# Patient Record
Sex: FEMALE | Race: WHITE | NOT HISPANIC OR LATINO | Employment: FULL TIME | URBAN - METROPOLITAN AREA
[De-identification: names, ages, dates, MRNs, and addresses within clinical notes are randomized per-mention and may not be internally consistent; named-entity substitution may affect disease eponyms.]

---

## 2022-01-19 ENCOUNTER — TRANSCRIBE ORDERS (OUTPATIENT)
Dept: PHYSICAL THERAPY | Facility: HOSPITAL | Age: 47
End: 2022-01-19

## 2022-01-19 DIAGNOSIS — S06.9X0A TRAUMATIC BRAIN INJURY, WITHOUT LOSS OF CONSCIOUSNESS, INITIAL ENCOUNTER: Primary | ICD-10-CM

## 2022-01-25 ENCOUNTER — HOSPITAL ENCOUNTER (OUTPATIENT)
Dept: SPEECH THERAPY | Facility: HOSPITAL | Age: 47
Setting detail: THERAPIES SERIES
Discharge: HOME | End: 2022-01-25

## 2022-01-25 ENCOUNTER — HOSPITAL ENCOUNTER (OUTPATIENT)
Dept: OCCUPATIONAL THERAPY | Facility: HOSPITAL | Age: 47
Setting detail: THERAPIES SERIES
Discharge: HOME | End: 2022-01-25

## 2022-01-25 DIAGNOSIS — S06.9X0A TRAUMATIC BRAIN INJURY WITHOUT LOSS OF CONSCIOUSNESS, INITIAL ENCOUNTER: Primary | ICD-10-CM

## 2022-01-25 DIAGNOSIS — R41.841 COGNITIVE COMMUNICATION DEFICIT: ICD-10-CM

## 2022-01-25 DIAGNOSIS — R53.1 GENERALIZED WEAKNESS: ICD-10-CM

## 2022-01-25 PROCEDURE — 97166 OT EVAL MOD COMPLEX 45 MIN: CPT

## 2022-01-25 PROCEDURE — 96125 COGNITIVE TEST BY HC PRO: CPT
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
## 2022-02-01 ENCOUNTER — HOSPITAL ENCOUNTER (OUTPATIENT)
Dept: OCCUPATIONAL THERAPY | Facility: HOSPITAL | Age: 47
Setting detail: THERAPIES SERIES
Discharge: HOME | End: 2022-02-01

## 2022-02-01 ENCOUNTER — HOSPITAL ENCOUNTER (OUTPATIENT)
Dept: PHYSICAL THERAPY | Facility: HOSPITAL | Age: 47
Setting detail: THERAPIES SERIES
Discharge: HOME | End: 2022-02-01

## 2022-02-01 ENCOUNTER — HOSPITAL ENCOUNTER (OUTPATIENT)
Dept: SPEECH THERAPY | Facility: HOSPITAL | Age: 47
Setting detail: THERAPIES SERIES
Discharge: HOME | End: 2022-02-01

## 2022-02-01 DIAGNOSIS — R41.841 COGNITIVE COMMUNICATION DEFICIT: ICD-10-CM

## 2022-02-01 DIAGNOSIS — M54.2 PAIN OF NECK WITH RECENT TRAUMATIC INJURY: ICD-10-CM

## 2022-02-01 DIAGNOSIS — S06.9X0A TRAUMATIC BRAIN INJURY WITHOUT LOSS OF CONSCIOUSNESS, INITIAL ENCOUNTER: Primary | ICD-10-CM

## 2022-02-01 DIAGNOSIS — R53.1 GENERALIZED WEAKNESS: ICD-10-CM

## 2022-02-01 DIAGNOSIS — M53.9 CERVICAL DYSFUNCTION: Primary | ICD-10-CM

## 2022-02-01 PROCEDURE — 97535 SELF CARE MNGMENT TRAINING: CPT

## 2022-02-01 PROCEDURE — 97110 THERAPEUTIC EXERCISES: CPT

## 2022-02-01 PROCEDURE — 97161 PT EVAL LOW COMPLEX 20 MIN: CPT

## 2022-02-01 PROCEDURE — 97130 THER IVNTJ EA ADDL 15 MIN: CPT

## 2022-02-01 PROCEDURE — 97129 THER IVNTJ 1ST 15 MIN: CPT
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
## 2022-02-01 NOTE — THERAPY TREATMENT NOTE
Outpatient Speech Language Pathology   Adult Speech Language Cognitive Treatment Note  [facility]     Patient Name: Lisandra Muniz  : 1975  MRN: [id number]  Today's Date: 2022         Visit Date: 2022   There is no problem list on file for this patient.         Visit Dx:    ICD-10-CM ICD-9-CM   1. 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 Cognitive communication deficit  R41.841 799.52        OP SLP Assessment/Plan - 22 1444        SLP Assessment    Functional Problems Speech Language- Adult/Cognition  -AL    SLP Diagnosis Mild high-level cognitive impairment  -AL    Prognosis Excellent (comment)  -AL       SLP Plan    Plan Comments Continue current plan of care.  -AL          User Key  (r) = Recorded By, (t) = Taken By, (c) = Cosigned By    Initials Name Provider Type    Elsa Hays MS CCC-SLP Speech and Language Pathologist                 Patient was wearing a face mask during this therapy encounter. Therapist used appropriate personal protective equipment including mask, eye protection and gloves.  Mask used was standard procedure mask. Appropriate PPE was worn during the entire therapy session. Hand hygiene was completed before and after therapy session. Patient is not in enhanced droplet precautions.                              SLP OP Goals     Row Name 22 1400          Subjective Comments    Subjective Comments Pt participated well in session.  -AL            Subjective Pain    Able to rate subjective pain? yes  -AL     Pre-Treatment Pain Level 0  -AL     Post-Treatment Pain Level 0  -AL            Verbal Expression Goals    Patient will be able to use verbal expressive language skills to communicate effectively in social/avocational/work setting 100%:; without cues  -AL     Status: Patient will be able to use verbal expressive language skills to communicate effectively in social/avocational/work setting  Progressing as expected  -AL     Patient will improve verbal expressive language skills by completing divergent naming tasks 100%:; without cues  -AL     Status: Patient will improve verbal expressive language skills by completing divergent naming tasks Progressing as expected  -AL     Comments: Patient will improve verbal expressive language skills by completing divergent naming tasks Red items: 8 with NO cues in 1 minute, 10 with MIN-MOD cues.  -AL            Memory Goals    Status: Patient will be able to remember information needed to participate in avocational activities Progressing as expected  -AL     Patient will demonstrate improved ability to recall information by immediately recalling a series of words 90%:; without cues  -AL     Status: Patient will demonstrate improved ability to recall information by immediately recalling a series of words Progressing as expected  -AL     Comments: Patient will demonstrate improved ability to recall information by immediately recalling a series of words 4 words reversed: 60% with NO cues, 100% with MIN-MOD cues. Pt reported increased difficulty concentrating as task progressed.  -AL     Patient will demonstrate improved ability to recall information by listening to paragraph and answering yes/no questions 90%:; without cues  -AL     Comments: Patient will demonstrate improved ability to recall information by listening to paragraph and answering yes/no questions Not addressed this session due to time constraints.  -AL            Attention/Orientation Goals    Patient will be able to execute all activities necessary to manage a home Independently  -AL     Status: Patient will be able to execute all activities necessary to manage a home Progressing as expected  -AL     Patient will improve attention skills by sustaining focus to high-level cognitive tasks in order to complete task 100%:; without cues  -AL     Status: Patient will improve attention skills by sustaining  "focus to high-level cognitive tasks in order to complete task Progressing as expected  -AL     Comments: Patient will improve attention skills by sustaining focus to high-level cognitive tasks in order to complete task Diagnostic treatment: Pt completed moderate level logic puzzle with 90% accuracy with NO cues, 100% with MIN cues. She reported feeling like the task took her longer than it would have prior to her brain injury. Alternating attention for \"Blink\" card sort atsk: Pt sorted 25 cards in 1 minute, 40 seconds with 1 cue. Pt played against clinician with minimally reduced speed.  -AL           User Key  (r) = Recorded By, (t) = Taken By, (c) = Cosigned By    Initials Name Provider Type    Elsa Hays MS CCC-SLP Speech and Language Pathologist               OP SLP Education     Row Name 02/01/22 1444       Education    Barriers to Learning No barriers identified  -AL    Education Provided Described results of evaluation  -AL    Learning Motivation Strong  -AL    Learning Method Explanation  -AL    Teaching Response Verbalized understanding; Demonstrated understanding  -AL    Education Comments Reviewed results of RBANS administered in previous session.  -AL          User Key  (r) = Recorded By, (t) = Taken By, (c) = Cosigned By    Initials Name Effective Dates    Elsa Hays MS CCC-SLP 06/16/21 -                      Time Calculation:   SLP Start Time: 1300  SLP Stop Time: 1345  SLP Time Calculation (min): 45 min  Timed Charges  10495-QZ Dev of Cogn Skills Initial Minutes: 15  30889-JJ Dev of Cogn Skills Add Minutes: 30  Total Minutes  Timed Charges Total Minutes: 45   Total Minutes: 45    Therapy Charges for Today     Code Description Service Date Service Provider Modifiers Qty    54069683622 HC ST DEV OF COGN SKILLS INITIAL 15 MIN 2/1/2022 Elsa Allen MS CCC-SLP 59 1    27792589470 HC ST DEV OF COGN SKILLS EACH ADDT'L 15 MIN 2/1/2022 Elsa Allen MS CCC-SLP 59 2           "         Elsa Allen, MS CCC-SLP  2/1/2022

## 2022-02-08 ENCOUNTER — APPOINTMENT (OUTPATIENT)
Dept: SPEECH THERAPY | Facility: HOSPITAL | Age: 47
End: 2022-02-08

## 2022-02-08 ENCOUNTER — APPOINTMENT (OUTPATIENT)
Dept: PHYSICAL THERAPY | Facility: HOSPITAL | Age: 47
End: 2022-02-08

## 2022-02-08 ENCOUNTER — APPOINTMENT (OUTPATIENT)
Dept: OCCUPATIONAL THERAPY | Facility: HOSPITAL | Age: 47
End: 2022-02-08

## 2022-02-15 ENCOUNTER — HOSPITAL ENCOUNTER (OUTPATIENT)
Dept: SPEECH THERAPY | Facility: HOSPITAL | Age: 47
Setting detail: THERAPIES SERIES
Discharge: HOME | End: 2022-02-15

## 2022-02-15 ENCOUNTER — APPOINTMENT (OUTPATIENT)
Dept: PHYSICAL THERAPY | Facility: HOSPITAL | Age: 47
End: 2022-02-15

## 2022-02-15 ENCOUNTER — HOSPITAL ENCOUNTER (OUTPATIENT)
Dept: PHYSICAL THERAPY | Facility: HOSPITAL | Age: 47
Setting detail: THERAPIES SERIES
Discharge: HOME | End: 2022-02-15

## 2022-02-15 ENCOUNTER — HOSPITAL ENCOUNTER (OUTPATIENT)
Dept: OCCUPATIONAL THERAPY | Facility: HOSPITAL | Age: 47
Setting detail: THERAPIES SERIES
Discharge: HOME | End: 2022-02-15

## 2022-02-15 DIAGNOSIS — S06.9X0A TRAUMATIC BRAIN INJURY WITHOUT LOSS OF CONSCIOUSNESS, INITIAL ENCOUNTER: Primary | ICD-10-CM

## 2022-02-15 DIAGNOSIS — R53.1 GENERALIZED WEAKNESS: ICD-10-CM

## 2022-02-15 DIAGNOSIS — M54.2 PAIN OF NECK WITH RECENT TRAUMATIC INJURY: ICD-10-CM

## 2022-02-15 DIAGNOSIS — R41.841 COGNITIVE COMMUNICATION DEFICIT: ICD-10-CM

## 2022-02-15 DIAGNOSIS — M53.9 CERVICAL DYSFUNCTION: Primary | ICD-10-CM

## 2022-02-15 PROCEDURE — 97140 MANUAL THERAPY 1/> REGIONS: CPT

## 2022-02-15 PROCEDURE — 97535 SELF CARE MNGMENT TRAINING: CPT

## 2022-02-15 PROCEDURE — 97129 THER IVNTJ 1ST 15 MIN: CPT

## 2022-02-15 PROCEDURE — 97110 THERAPEUTIC EXERCISES: CPT

## 2022-02-15 PROCEDURE — 97130 THER IVNTJ EA ADDL 15 MIN: CPT

## 2022-02-15 PROCEDURE — 97530 THERAPEUTIC ACTIVITIES: CPT

## 2022-02-15 NOTE — THERAPY TREATMENT NOTE
Outpatient Speech Language Pathology   Adult Speech Language Cognitive Treatment Note  [facility]     Patient Name: Lisandra Muniz  : 1975  MRN: [id number]  Today's Date: 2/15/2022         Visit Date: 02/15/2022   There is no problem list on file for this patient.         Visit Dx:    ICD-10-CM ICD-9-CM   1. 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 Cognitive communication deficit  R41.841 799.52        OP SLP Assessment/Plan - 02/15/22 6634        SLP Assessment    Functional Problems Speech Language- Adult/Cognition  -AL    Clinical Impression: Speech Language-Adult/Congnition Mild:; Cognitive Communication Impairment  -AL    Prognosis Good (comment)  -AL       SLP Plan    Plan Comments Continue current plan of care.  -AL          User Key  (r) = Recorded By, (t) = Taken By, (c) = Cosigned By    Initials Name Provider Type    Elsa Hays MS CCC-SLP Speech and Language Pathologist                 Patient was wearing a face mask during this therapy encounter. Therapist used appropriate personal protective equipment including mask, eye protection and gloves.  Mask used was standard procedure mask. Appropriate PPE was worn during the entire therapy session. Hand hygiene was completed before and after therapy session. Patient is not in enhanced droplet precautions.                              SLP OP Goals     Row Name 02/15/22 2051          Goal Type Needed    Goal Type Needed Other Adult Goals  -AL            Subjective Comments    Subjective Comments Pt participated well. She reported that she notices she forgets what she wants to say when communicating in a group of people and waiting for her turn to talk.  -AL            Subjective Pain    Able to rate subjective pain? yes  -AL     Pre-Treatment Pain Level 5  -AL     Post-Treatment Pain Level 5  -AL     Subjective Pain Comment headache  -AL            Verbal Expression Goals    Patient  will be able to use verbal expressive language skills to communicate effectively in social/avocational/work setting 100%:; without cues  -AL     Status: Patient will be able to use verbal expressive language skills to communicate effectively in social/avocational/work setting Progressing as expected  -AL     Comments: Patient will be able to use verbal expressive language skills to communicate effectively in social/avocational/work setting Pt discussed difficulty she is having with recalling what she wants to say in group conversation and following the conversation. Discussed impact of attention deficit and working memory deficit on conversation.  -AL     Patient will improve verbal expressive language skills by completing divergent naming tasks 100%:; without cues  -AL     Comments: Patient will improve verbal expressive language skills by completing divergent naming tasks Not addressed this session due to time constraints.  -AL            Memory Goals    Status: Patient will be able to remember information needed to participate in avocational activities Progressing as expected  -AL     Patient will demonstrate improved ability to recall information by immediately recalling a series of words 90%:; without cues  -AL     Status: Patient will demonstrate improved ability to recall information by immediately recalling a series of words Progressing as expected  -AL     Comments: Patient will demonstrate improved ability to recall information by immediately recalling a series of words 7 digit span repetition: 0% with NO cues, 60% with MOD cues, 100% with MAX cues. 6 digit span: 100% with NO cues.  -AL            Attention/Orientation Goals    Patient will be able to execute all activities necessary to manage a home Independently  -AL     Status: Patient will be able to execute all activities necessary to manage a home Progressing as expected  -AL     Patient will improve attention skills by sustaining focus to high-level  cognitive tasks in order to complete task 100%:; without cues  -AL     Status: Patient will improve attention skills by sustaining focus to high-level cognitive tasks in order to complete task Progressing as expected  -AL     Comments: Patient will improve attention skills by sustaining focus to high-level cognitive tasks in order to complete task Administered FAVRES scheduling task: Pt scheduled 9/10 items correctly; ommited one item. She required MIN cues to prioritze tasks. She required NO cues to determine alloted time to complete. Pt was able to sustain attention on this task for ~15 minutes with NO redirections.  -AL            Other Goals    Other Adult Goal- 1 Pt will complete high-level verbal working memory tasks with 80% accuracy with NO cues.  -AL     Status: Other Adult Goal- 1 New  -AL     Comments: Other Adult Goal- 1 4 words reversed: 40% with NO cues, 80% with MIN cues, 100% with MOD cues  -AL           User Key  (r) = Recorded By, (t) = Taken By, (c) = Cosigned By    Initials Name Provider Type    Elsa Hays MS CCC-SLP Speech and Language Pathologist               OP SLP Education     Row Name 02/15/22 1456       Education    Barriers to Learning No barriers identified  -AL    Education Provided Patient demonstrated recommended strategies  -AL    Assessed Learning needs; Learning motivation; Learning preferences; Learning readiness  -AL    Learning Motivation Strong  -AL    Learning Method Explanation  -AL    Teaching Response Verbalized understanding  -AL    Education Comments Discussed pt's progress toward cognitive goals. Provided homework exercises for deductive reasoning, attention to detail and working memory.  -AL          User Key  (r) = Recorded By, (t) = Taken By, (c) = Cosigned By    Initials Name Effective Dates    Elsa Hays MS CCC-SLP 06/16/21 -                      Time Calculation:   SLP Start Time: 1300  SLP Stop Time: 1345  SLP Time Calculation (min): 45  min  Timed Charges  21015-MD Dev of Cogn Skills Initial Minutes: 15  03868-GF Dev of Cogn Skills Add Minutes: 30  Total Minutes  Timed Charges Total Minutes: 45   Total Minutes: 45    Therapy Charges for Today     Code Description Service Date Service Provider Modifiers Qty    75364547123 HC ST DEV OF COGN SKILLS INITIAL 15 MIN 2/15/2022 Elsa Allen, MS CCC-SLP  1    53585144167 HC ST DEV OF COGN SKILLS EACH ADDT'L 15 MIN 2/15/2022 Elsa Allen, MS CCC-SLP  2                   Elsa Allen, MS CCC-SLP  2/15/2022

## 2022-02-15 NOTE — THERAPY TREATMENT NOTE
Outpatient Occupational Therapy Neuro Treatment Note  [facility]     Patient Name: Lisandra Muniz  : 1975  MRN: [id number]  Today's Date: 2/15/2022       Visit Date: 02/15/2022    There is no problem list on file for this patient.       No past medical history on file.     No past surgical history on file.      Visit Dx:    ICD-10-CM ICD-9-CM   1. 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 Generalized weakness  R53.1 780.79                    OT Assessment/Plan     Row Name 02/15/22 1403          OT Assessment    Assessment Comments Pt not seen previous week for OT due to illness. OT trials use of TENS during therapeutic exercises this date to decrease pain in R shoulder. Pt educated further on proper body mechanics to decrease back and neck pain during daily functional tasks.  -MR           User Key  (r) = Recorded By, (t) = Taken By, (c) = Cosigned By    Initials Name Provider Type    Manda Cui, OT Occupational Therapist                           Therapy Education  Given: HEP, Symptoms/condition management, Posture/body mechanics  Program: Reinforced, Progressed (UE strengthening; proper body mechanics during daily functional activities)  How Provided: Verbal, Demonstration, Written  Provided to: Patient  Level of Understanding: Teach back education performed, Verbalized, Demonstrated  05167 - OT Self Care/Mgmt Minutes: 15     Modalities     Row Name 02/15/22 1300             ELECTRICAL STIMULATION    Stimulation Type --  TENS  -MR      Max mAmp 13  -MR      Location/Electrode Placement/Other Electrodes applied over R shoulder for pain relief during OT session. Pt tolerates stimulation with no complaints, skin integrity intact following treatment.  -MR            User Key  (r) = Recorded By, (t) = Taken By, (c) = Cosigned By    Initials Name Provider Type    Manda Cui, OT Occupational Therapist               OT Exercises      Row Name 02/15/22 1400             Subjective Pain    Able to rate subjective pain? yes  -MR      Pre-Treatment Pain Level 6  -MR      Subjective Pain Comment neck pain/headache  -MR              Exercise 1    Exercise Name 1 UE therapeutic exercise. Using hand weight pt performs bicep curls, forearm pronation/supination, wrist flexion/extension. UE supported on table during exercises to reduce strain on shoulder.  -MR      Cueing 1 Verbal; Demo  -MR      Equipment 1 Dumbell  -MR      Weights/Plates 1 2  -MR      Sets 1 3  -MR      Reps 1 10  -MR              Exercise 2    Exercise Name 2 Bilateral  strengthening. Pt performs activity to improve  strength with BUEs, uses theraputty to perform  strengthening exercises following OT demo/cues.  -MR      Cueing 2 Verbal; Demo  -MR      Equipment 2 Theraputty  -MR      Resistance 2 Yellow; Red  -MR              Exercise 3    Exercise Name 3 Pt educated on strategies to reduce stress and strain on back during daily functional tasks. Given written handout with example of good body mechanics during activities.  -MR      Cueing 3 Verbal; Demo; Other (comment)  handout  -MR            User Key  (r) = Recorded By, (t) = Taken By, (c) = Cosigned By    Initials Name Provider Type    Manda Cui, OT Occupational Therapist                            Time Calculation:   OT Start Time: 1346  OT Stop Time: 1430  OT Time Calculation (min): 44 min  Total Timed Code Minutes- OT: 44 minute(s)  Timed Charges  59289 - OT Therapeutic Exercise Minutes: 29  50754 - OT Self Care/Mgmt Minutes: 15  Total Minutes  Timed Charges Total Minutes: 44   Total Minutes: 44     Therapy Charges for Today     Code Description Service Date Service Provider Modifiers Qty    88430875967 HC OT THER PROC EA 15 MIN 2/15/2022 Manda LagunasAlia, OT GO 2    15896085594 HC OT SELF CARE/MGMT/TRAIN EA 15 MIN 2/15/2022 Janneth Manda Valenzuela OT GO 1              Appropriate PPE was worn during the  entire therapy session. Hand hygiene was completed before and after therapy session. Patient is not in enhanced droplet precautions.              Manda Lagunas, OT  2/15/2022
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
## 2022-02-22 ENCOUNTER — HOSPITAL ENCOUNTER (OUTPATIENT)
Dept: SPEECH THERAPY | Facility: HOSPITAL | Age: 47
Setting detail: THERAPIES SERIES
Discharge: HOME | End: 2022-02-22

## 2022-02-22 ENCOUNTER — APPOINTMENT (OUTPATIENT)
Dept: PHYSICAL THERAPY | Facility: HOSPITAL | Age: 47
End: 2022-02-22

## 2022-02-22 ENCOUNTER — HOSPITAL ENCOUNTER (OUTPATIENT)
Dept: OCCUPATIONAL THERAPY | Facility: HOSPITAL | Age: 47
Setting detail: THERAPIES SERIES
Discharge: HOME | End: 2022-02-22

## 2022-02-22 DIAGNOSIS — R41.841 COGNITIVE COMMUNICATION DEFICIT: ICD-10-CM

## 2022-02-22 DIAGNOSIS — R53.1 GENERALIZED WEAKNESS: ICD-10-CM

## 2022-02-22 DIAGNOSIS — S06.9X0A TRAUMATIC BRAIN INJURY WITHOUT LOSS OF CONSCIOUSNESS, INITIAL ENCOUNTER: Primary | ICD-10-CM

## 2022-02-22 PROCEDURE — 97130 THER IVNTJ EA ADDL 15 MIN: CPT

## 2022-02-22 PROCEDURE — 97129 THER IVNTJ 1ST 15 MIN: CPT

## 2022-02-22 PROCEDURE — 97110 THERAPEUTIC EXERCISES: CPT
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
## 2022-03-01 ENCOUNTER — HOSPITAL ENCOUNTER (OUTPATIENT)
Dept: OCCUPATIONAL THERAPY | Facility: HOSPITAL | Age: 47
Setting detail: THERAPIES SERIES
Discharge: HOME | End: 2022-03-01

## 2022-03-01 ENCOUNTER — APPOINTMENT (OUTPATIENT)
Dept: PHYSICAL THERAPY | Facility: HOSPITAL | Age: 47
End: 2022-03-01

## 2022-03-01 ENCOUNTER — HOSPITAL ENCOUNTER (OUTPATIENT)
Dept: SPEECH THERAPY | Facility: HOSPITAL | Age: 47
Setting detail: THERAPIES SERIES
Discharge: HOME | End: 2022-03-01

## 2022-03-01 DIAGNOSIS — S06.9X0A TRAUMATIC BRAIN INJURY WITHOUT LOSS OF CONSCIOUSNESS, INITIAL ENCOUNTER: Primary | ICD-10-CM

## 2022-03-01 DIAGNOSIS — R53.1 GENERALIZED WEAKNESS: ICD-10-CM

## 2022-03-01 DIAGNOSIS — R41.841 COGNITIVE COMMUNICATION DEFICIT: ICD-10-CM

## 2022-03-01 PROCEDURE — 97130 THER IVNTJ EA ADDL 15 MIN: CPT

## 2022-03-01 PROCEDURE — 97530 THERAPEUTIC ACTIVITIES: CPT

## 2022-03-01 PROCEDURE — 97110 THERAPEUTIC EXERCISES: CPT

## 2022-03-01 PROCEDURE — 97129 THER IVNTJ 1ST 15 MIN: CPT
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
## 2022-03-01 NOTE — THERAPY PROGRESS REPORT/RE-CERT
Outpatient Speech Language Pathology   Adult Speech Language Cognitive Progress Note  [facility]     Patient Name: Lisandra Muniz  : 1975  MRN: [id number]  Today's Date: 3/1/2022         Visit Date: 2022   There is no problem list on file for this patient.         Visit Dx:    ICD-10-CM ICD-9-CM   1. 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 Cognitive communication deficit  R41.841 799.52     Patient was wearing a face mask during this therapy encounter. Therapist used appropriate personal protective equipment including mask, eye protection and gloves.  Mask used was standard procedure mask. Appropriate PPE was worn during the entire therapy session. Hand hygiene was completed before and after therapy session. Patient is not in enhanced droplet precautions.              OP SLP Assessment/Plan - 22 1628        SLP Assessment    Functional Problems Speech Language- Adult/Cognition  -AL    Impact on Function: Adult Speech Language/Cognition Poor attention to task; Trouble learning or remembering new information  -AL    Clinical Impression: Speech Language-Adult/Congnition Mild:; Cognitive Communication Impairment  -AL    Prognosis Excellent (comment)  -AL    Patient/caregiver participated in establishment of treatment plan and goals Yes  -AL    Patient would benefit from skilled therapy intervention Yes  -AL       SLP Plan    Frequency 1x week  -AL    Duration 4 weeks  -AL    Planned CPT's? SLP DEV COG SKILLS INITIAL (15 MIN) : 94783; SLP DEV COG SKILLS ADD (15 MIN) : 53468  -AL    Expected Duration of Therapy Session (SLP Eval) 45  -AL          User Key  (r) = Recorded By, (t) = Taken By, (c) = Cosigned By    Initials Name Provider Type    Elsa Hays MS CCC-SLP Speech and Language Pathologist                                   SLP OP Goals     Row Name 22 1600          Subjective Comments    Subjective Comments Pt  participated well in session. She reported an episode recently in which she said a word, but did not feel like it was the right word for what she was trying to say.  -AL            Subjective Pain    Able to rate subjective pain? yes  -AL     Pre-Treatment Pain Level 0  -AL     Post-Treatment Pain Level 0  -AL            Verbal Expression Goals    Patient will be able to use verbal expressive language skills to communicate effectively in social/avocational/work setting 100%:; without cues  -AL     Status: Patient will be able to use verbal expressive language skills to communicate effectively in social/avocational/work setting Progressing as expected  -AL     Comments: Patient will be able to use verbal expressive language skills to communicate effectively in social/avocational/work setting Pt is making steady progress toward verbal expression goal, but she requires MIN cues to complete abstract divergent thinking tasks. She exhibits occasional word finding delays in conversation.  -AL     Patient will improve verbal expressive language skills by completing divergent naming tasks 100%:; without cues  -AL     Status: Patient will improve verbal expressive language skills by completing divergent naming tasks Progressing as expected  -AL     Comments: Patient will improve verbal expressive language skills by completing divergent naming tasks Sharp items: 5 with NO cues in 1 minute, 8 with MIN cues.  -AL            Memory Goals    Status: Patient will be able to remember information needed to participate in avocational activities Progressing as expected  -AL     Comments: Patient will be able to remember information needed to participate in avocational activities Pt is making steady progress toward short-term memory goals. She requires overall MIN-MOD cues for immediate memory for repetition of 7 digit span and requires MIN-MOD cues to complete high-level working memory tasks.  -AL     Patient will demonstrate improved  ability to recall information by immediately recalling a series of words 90%:; without cues  -AL     Status: Patient will demonstrate improved ability to recall information by immediately recalling a series of words Progressing as expected  -AL     Comments: Patient will demonstrate improved ability to recall information by immediately recalling a series of words 7 digit span repetition: 20% with NO cues, 70% with MIN cues, 100% with MOD-MAX cues.  -AL     Status: Patient will demonstrate improved ability to recall information by listening to paragraph and answering yes/no questions New  -AL     Comments: Patient will demonstrate improved ability to recall information by listening to paragraph and answering yes/no questions Not addressed this session due to time constraints.  -AL            Attention/Orientation Goals    Patient will be able to execute all activities necessary to manage a home Independently  -AL     Status: Patient will be able to execute all activities necessary to manage a home Progressing as expected  -AL     Comments: Patient will be able to execute all activities necessary to manage a home Pt is making steady progress toward attention goal, requiring rare MIN cues to complete high-level attention tasks, such as a complex logic puzzle.  -AL     Patient will improve attention skills by sustaining focus to high-level cognitive tasks in order to complete task 100%:; without cues  -AL     Status: Patient will improve attention skills by sustaining focus to high-level cognitive tasks in order to complete task Progressing as expected  -AL     Comments: Patient will improve attention skills by sustaining focus to high-level cognitive tasks in order to complete task Pt completed a complex exclusion style logic puzzle with 90% accuracy with NO cues, 100% with MIN cues for attention to detail.  -AL            Other Goals    Other Adult Goal- 1 Pt will complete high-level verbal working memory tasks with  80% accuracy with NO cues.  -AL     Status: Other Adult Goal- 1 Progressing as expected  -AL     Comments: Other Adult Goal- 1 4 words alphabetized: 30% with NO cues, 50% with 1 repetition, 80% with MIN cues, 100% with MOD cues. Increased difficulty noted as task progressed.  -AL           User Key  (r) = Recorded By, (t) = Taken By, (c) = Cosigned By    Initials Name Provider Type    Elsa Hays MS CCC-SLP Speech and Language Pathologist               OP SLP Education     Row Name 03/01/22 1629       Education    Barriers to Learning No barriers identified  -AL    Assessed Learning needs; Learning motivation; Learning preferences; Learning readiness  -AL    Learning Motivation Strong  -AL    Learning Method Explanation; Demonstration  -AL    Teaching Response Verbalized understanding; Demonstrated understanding  -AL    Education Comments Discussed pt's progress toward cognitive goals. Provided home exercise for attention to detail.  -AL          User Key  (r) = Recorded By, (t) = Taken By, (c) = Cosigned By    Initials Name Effective Dates    Elsa Hays MS CCC-SLP 06/16/21 -               SLP Outcome Measures (last 72 hours)     SLP Outcome Measures     Row Name 03/01/22 1600             SLP Outcome Measures    Outcome Measure Used? Adult NOMS  -AL              Adult FCM Scores    FCM Chosen Verbal Expression; Memory  -AL      Verbal Expression FCM Score 6  -AL      Memory FCM Score 5  -AL            User Key  (r) = Recorded By, (t) = Taken By, (c) = Cosigned By    Initials Name Effective Dates    AL Elsa Allen MS CCC-SLP 06/16/21 -                    Time Calculation:   SLP Start Time: 1305  SLP Stop Time: 1345  SLP Time Calculation (min): 40 min  Timed Charges  00161-HK Dev of Cogn Skills Initial Minutes: 15  07418-CE Dev of Cogn Skills Add Minutes: 25  Total Minutes  Timed Charges Total Minutes: 40   Total Minutes: 40    Therapy Charges for Today     Code Description Service Date  Service Provider Modifiers Qty    42302830779 HC ST DEV OF COGN SKILLS INITIAL 15 MIN 3/1/2022 Elsa Allen, MS CCC-SLP 59 1    33981752059 HC ST DEV OF COGN SKILLS EACH ADDT'L 15 MIN 3/1/2022 Elsa Allen, MS CCC-SLP 59 2                   Elsa Allen MS CCC-SLP  3/1/2022

## 2022-03-08 ENCOUNTER — HOSPITAL ENCOUNTER (OUTPATIENT)
Dept: SPEECH THERAPY | Facility: HOSPITAL | Age: 47
Setting detail: THERAPIES SERIES
Discharge: HOME | End: 2022-03-08

## 2022-03-08 ENCOUNTER — HOSPITAL ENCOUNTER (OUTPATIENT)
Dept: OCCUPATIONAL THERAPY | Facility: HOSPITAL | Age: 47
Setting detail: THERAPIES SERIES
Discharge: HOME | End: 2022-03-08

## 2022-03-08 ENCOUNTER — APPOINTMENT (OUTPATIENT)
Dept: PHYSICAL THERAPY | Facility: HOSPITAL | Age: 47
End: 2022-03-08

## 2022-03-08 DIAGNOSIS — R53.1 GENERALIZED WEAKNESS: ICD-10-CM

## 2022-03-08 DIAGNOSIS — S06.9X0A TRAUMATIC BRAIN INJURY WITHOUT LOSS OF CONSCIOUSNESS, INITIAL ENCOUNTER: Primary | ICD-10-CM

## 2022-03-08 DIAGNOSIS — R41.841 COGNITIVE COMMUNICATION DEFICIT: ICD-10-CM

## 2022-03-08 PROCEDURE — 97110 THERAPEUTIC EXERCISES: CPT

## 2022-03-08 PROCEDURE — 97129 THER IVNTJ 1ST 15 MIN: CPT

## 2022-03-08 PROCEDURE — 97130 THER IVNTJ EA ADDL 15 MIN: CPT

## 2022-03-08 PROCEDURE — 97535 SELF CARE MNGMENT TRAINING: CPT

## 2022-03-08 NOTE — THERAPY TREATMENT NOTE
Outpatient Occupational Therapy Neuro Treatment Note  [facility]     Patient Name: Lisandra Muniz  : 1975  MRN: [id number]  Today's Date: 3/8/2022       Visit Date: 2022    There is no problem list on file for this patient.       No past medical history on file.     No past surgical history on file.      Visit Dx:    ICD-10-CM ICD-9-CM   1. 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 Generalized weakness  R53.1 780.79                    OT Assessment/Plan     Row Name 22 1400          OT Assessment    Assessment Comments Pt seen for OT session with emphasis on proper body mechanics during kitchen tasks. Pt reports compliance with home exercise program. Additionally states she has made adjustments to her work station based on OT's recommendations to promote better posture and less strain on neck and back.  -MR           User Key  (r) = Recorded By, (t) = Taken By, (c) = Cosigned By    Initials Name Provider Type    Manda Cui, OT Occupational Therapist                           Therapy Education  Given: HEP, Posture/body mechanics  Program: Progressed, Reinforced (pt instructed on proper body mechanics during kitchen tasks)  How Provided: Verbal, Demonstration  Provided to: Patient  Level of Understanding: Teach back education performed, Verbalized, Demonstrated  91461 - OT Self Care/Mgmt Minutes: 29       OT Exercises     Row Name 22 1400             Subjective Pain    Able to rate subjective pain? yes  -MR      Pre-Treatment Pain Level 6  -MR      Subjective Pain Comment neck pain  -MR              Exercise 1    Exercise Name 1 OT provides instruction in ADL kitchen regarding proper body mechanics during item retrieval, kitchen mobility, and meal preparation. OT provides demo of safe body mechanics during kitchen activities, pt performs following OT demo/cues.  -MR      Cueing 1 Verbal;Demo  -MR              Exercise 2     Exercise Name 2 UE therapeutic exercise. Using 2 lb hand weight, pt performs bicep curls, forearm pronation/supination, wrist flexion/extension. OT provides demo and cues for proper technique. Pt performs median nerve glides intermittently during exercises.  -MR      Cueing 2 Verbal;Demo  -MR      Equipment 2 Dumbell  -MR      Weights/Plates 2 2  -MR      Sets 2 3  -MR      Reps 2 10  -MR            User Key  (r) = Recorded By, (t) = Taken By, (c) = Cosigned By    Initials Name Provider Type    Manda Cui OT Occupational Therapist                            Time Calculation:   OT Start Time: 1347  OT Stop Time: 1430  OT Time Calculation (min): 43 min  Timed Charges  28627 - OT Therapeutic Exercise Minutes: 14  29775 - OT Self Care/Mgmt Minutes: 29  Total Minutes  Timed Charges Total Minutes: 43   Total Minutes: 43     Therapy Charges for Today     Code Description Service Date Service Provider Modifiers Qty    33802423227 HC OT THER PROC EA 15 MIN 3/8/2022 Manda Lagunas OT GO 1    32768560210 HC OT SELF CARE/MGMT/TRAIN EA 15 MIN 3/8/2022 Manda Lagunas OT GO 2              Appropriate PPE was worn during the entire therapy session. Hand hygiene was completed before and after therapy session. Patient is not in enhanced droplet precautions.              Manda Lagunas OT  3/8/2022

## 2022-03-08 NOTE — THERAPY TREATMENT NOTE
Outpatient Speech Language Pathology   Adult Speech Language Cognitive Treatment Note  [facility]     Patient Name: Lisandra Muniz  : 1975  MRN: [id number]  Today's Date: 3/8/2022         Visit Date: 2022   There is no problem list on file for this patient.         Visit Dx:    ICD-10-CM ICD-9-CM   1. 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 Cognitive communication deficit  R41.841 799.52        OP SLP Assessment/Plan - 22 1630        SLP Assessment    Functional Problems Speech Language- Adult/Cognition  -AL    Clinical Impression: Speech Language-Adult/Congnition Mild:;Cognitive Communication Impairment  -AL    Prognosis Good (comment)  -AL       SLP Plan    Plan Comments Continue current plan of care.  -AL          User Key  (r) = Recorded By, (t) = Taken By, (c) = Cosigned By    Initials Name Provider Type    Elsa Hays MS CCC-SLP Speech and Language Pathologist                   Patient was wearing a face mask during this therapy encounter. Therapist used appropriate personal protective equipment including mask, eye protection and gloves.  Mask used was standard procedure mask. Appropriate PPE was worn during the entire therapy session. Hand hygiene was completed before and after therapy session. Patient is not in enhanced droplet precautions.                          SLP OP Goals     Row Name 22 1310          Subjective Comments    Subjective Comments Pt participated well. She arrived 10 minutes late to appointment. 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  -AL            Subjective Pain    Able to rate subjective pain? yes  -AL     Pre-Treatment Pain Level 0  -AL     Post-Treatment Pain Level 0  -AL            Verbal Expression Goals    Patient will be able to use verbal expressive language skills to communicate effectively in social/avocational/work  setting 100%:;without cues  -AL     Status: Patient will be able to use verbal expressive language skills to communicate effectively in social/avocational/work setting Progressing as expected  -AL     Comments: Patient will improve verbal expressive language skills by completing divergent naming tasks Fast items: 8 with NO cues, 11 with additional time. Green items: 9 with NO cues in 1 minute, 10 with MIN cue.  -AL            Memory Goals    Status: Patient will be able to remember information needed to participate in avocational activities Progressing as expected  -AL            Attention/Orientation Goals    Patient will be able to execute all activities necessary to manage a home Independently  -AL     Status: Patient will be able to execute all activities necessary to manage a home Progressing as expected  -AL     Patient will improve attention skills by sustaining focus to high-level cognitive tasks in order to complete task 100%:;without cues  -AL     Status: Patient will improve attention skills by sustaining focus to high-level cognitive tasks in order to complete task Progressing as expected  -AL     Comments: Patient will improve attention skills by sustaining focus to high-level cognitive tasks in order to complete task Complex exclusion style logic puzzle: 80% with NO cues, 100% with MIN cues for attention to detail.  -AL           User Key  (r) = Recorded By, (t) = Taken By, (c) = Cosigned By    Initials Name Provider Type    Elsa Hays MS CCC-SLP Speech and Language Pathologist               OP SLP Education     Row Name 03/08/22 0327       Education    Assessed Learning needs;Learning motivation;Learning preferences;Learning readiness  -AL    Learning Motivation Strong  -AL    Learning Method Explanation  -AL    Teaching Response Verbalized understanding  -AL    Education Comments Discussed strategies to assist with managing attentional demands when driving.  -AL          User Key  (r) =  Recorded By, (t) = Taken By, (c) = Cosigned By    Initials Name Effective Dates    Elsa Hays, MS CCC-SLP 06/16/21 -                      Time Calculation:   SLP Start Time: 1310  SLP Stop Time: 1345  SLP Time Calculation (min): 35 min  Timed Charges  40645-ME Dev of Cogn Skills Initial Minutes: 15  80628-QO Dev of Cogn Skills Add Minutes: 20  Total Minutes  Timed Charges Total Minutes: 35   Total Minutes: 35    Therapy Charges for Today     Code Description Service Date Service Provider Modifiers Qty    72101200363 HC ST DEV OF COGN SKILLS INITIAL 15 MIN 3/8/2022 Elsa Allen, MS CCC-SLP  1    66953291828 HC ST DEV OF COGN SKILLS EACH ADDT'L 15 MIN 3/8/2022 Elsa Allen, MS CCC-SLP  1                   Elsa Allen MS JALIL-SLP  3/8/2022

## 2022-03-15 ENCOUNTER — HOSPITAL ENCOUNTER (OUTPATIENT)
Dept: OCCUPATIONAL THERAPY | Facility: HOSPITAL | Age: 47
Setting detail: THERAPIES SERIES
Discharge: HOME | End: 2022-03-15

## 2022-03-15 ENCOUNTER — HOSPITAL ENCOUNTER (OUTPATIENT)
Dept: PHYSICAL THERAPY | Facility: HOSPITAL | Age: 47
Setting detail: THERAPIES SERIES
Discharge: HOME | End: 2022-03-15

## 2022-03-15 ENCOUNTER — APPOINTMENT (OUTPATIENT)
Dept: PHYSICAL THERAPY | Facility: HOSPITAL | Age: 47
End: 2022-03-15

## 2022-03-15 ENCOUNTER — HOSPITAL ENCOUNTER (OUTPATIENT)
Dept: SPEECH THERAPY | Facility: HOSPITAL | Age: 47
Setting detail: THERAPIES SERIES
Discharge: HOME | End: 2022-03-15

## 2022-03-15 DIAGNOSIS — S06.9X0A TRAUMATIC BRAIN INJURY WITHOUT LOSS OF CONSCIOUSNESS, INITIAL ENCOUNTER: Primary | ICD-10-CM

## 2022-03-15 DIAGNOSIS — R53.1 GENERALIZED WEAKNESS: ICD-10-CM

## 2022-03-15 DIAGNOSIS — M53.9 CERVICAL DYSFUNCTION: Primary | ICD-10-CM

## 2022-03-15 DIAGNOSIS — M54.2 PAIN OF NECK WITH RECENT TRAUMATIC INJURY: ICD-10-CM

## 2022-03-15 DIAGNOSIS — R41.841 COGNITIVE COMMUNICATION DEFICIT: ICD-10-CM

## 2022-03-15 PROCEDURE — 97530 THERAPEUTIC ACTIVITIES: CPT

## 2022-03-15 PROCEDURE — 97140 MANUAL THERAPY 1/> REGIONS: CPT

## 2022-03-15 PROCEDURE — 97130 THER IVNTJ EA ADDL 15 MIN: CPT

## 2022-03-15 PROCEDURE — 97110 THERAPEUTIC EXERCISES: CPT

## 2022-03-15 PROCEDURE — 97129 THER IVNTJ 1ST 15 MIN: CPT

## 2022-03-15 NOTE — THERAPY PROGRESS REPORT/RE-CERT
Outpatient Physical Therapy Ortho Progress Note  [facility]     Patient Name: Lisandra Muniz  : 1975  MRN: [id number]  Today's Date: 3/15/2022      Visit Date: 03/15/2022    Visit Dx:    ICD-10-CM ICD-9-CM   1. Cervical dysfunction  M53.9 723.9   2. Pain of neck with recent traumatic injury  M54.2 723.1       There is no problem list on file for this patient.       No past medical history on file.     No past surgical history on file.                     PT Assessment/Plan     Row Name 03/15/22 1616          PT Assessment    Functional Limitations Limitation in home management;Limitations in community activities;Limitations in functional capacity and performance;Performance in leisure activities;Performance in self-care ADL;Performance in work activities  -     Impairments Pain;Posture;Range of motion;Poor body mechanics;Muscle strength  -     Assessment Comments Lisandra Muniz has been seen for 1 skilled physical therapy sessions (and re-evaluation) for neck pain and limited range in neck/UE. Treatment has included therapeutic exercise, manual therapy and patient education with home exercise program . Progress to physical therapy goals is fair as pt. Has met 0/4 STGs, and 0/4 LTGs due to not having therapy appointments scheduled for several weeks from re-evaluation due to schedule conflicts. Pt. Continues with OT, speech and chiropractic treatments in conjunction with PT now scheduled weekly to address cervical pain. She continues with limitations in cervical range L>R and reports intermittent tightness along thoracic spine. She will benefit from continued skilled physical therapy to address remaining impairments and functional limitations with more consistent appointments scheduled for next several weeks.  -     Rehab Potential Good  -     Patient/caregiver participated in establishment of treatment plan and goals Yes  -     Patient would benefit from skilled therapy intervention Yes  -MH             PT Plan    PT Frequency 1x/week  -     Predicted Duration of Therapy Intervention (PT) 8 visits  -     Planned CPT's? PT RE-EVAL: 58436;PT THER PROC EA 15 MIN: 75677;PT THER ACT EA 15 MIN: 20401;PT MANUAL THERAPY EA 15 MIN: 93110;PT NEUROMUSC RE-EDUCATION EA 15 MIN: 21861;PT ELECTRICAL STIM UNATTEND: ;PT ELECTRICAL STIM ATTD EA 15 MIN: 26274;PT ULTRASOUND EA 15 MIN: 97330;PT TRACTION CERVICAL: 22710  -     PT Plan Comments empahsis on progressing strengthening/thoracic mobility  -           User Key  (r) = Recorded By, (t) = Taken By, (c) = Cosigned By    Initials Name Provider Type     Katy Schneider, PT Physical Therapist                   OP Exercises     Row Name 03/15/22 1500             Subjective Comments    Subjective Comments I have been paying more attention to my posture, keeping my core engaged. I am still going to the chiropractor, OT and speech  -              Total Minutes    90383 - PT Therapeutic Exercise Minutes 14  -      16053 - PT Manual Therapy Minutes 25  -              Exercise 1    Exercise Name 1 UBE  -      Cueing 1 Verbal  -      Time 1 4 min  -      Additional Comments 2/2  -              Exercise 2    Exercise Name 2 pulleys  -      Cueing 2 Verbal;Tactile;Demo  -      Reps 2 1 min  -      Time 2 5sec  -      Additional Comments flexion  -              Exercise 3    Exercise Name 3 s/l thoracic rotation - open book  -      Cueing 3 Verbal;Tactile;Demo  -      Sets 3 --  -      Reps 3 8ea  -      Time 3 5sec  -              Exercise 4    Exercise Name 4 supine chin tuck + lift  -      Cueing 4 Verbal;Demo  -      Sets 4 --  -MH      Reps 4 10  -MH      Time 4 5sec  -              Exercise 5    Exercise Name 5 supine H-A  -MH      Cueing 5 Verbal;Demo  -MH      Reps 5 15  -MH      Time 5 RTB  -              Exercise 6    Exercise Name 6 supine alt UE flex  -      Cueing 6 Verbal;Demo  -MH      Reps 6 15ea  -MH             User Key  (r) = Recorded By, (t) = Taken By, (c) = Cosigned By    Initials Name Provider Type     Katy Schneider, PT Physical Therapist                         Manual Rx (last 36 hours)     Manual Treatments     Row Name 03/15/22 1500             Total Minutes    51341 - PT Manual Therapy Minutes 25  -              Manual Rx 1    Manual Rx 1 Location supine for C-spine  -      Manual Rx 1 Type gentle PROM c-spine  -      Manual Rx 1 Grade gentle STM for cervical muscles w/difuse faciltiation  -              Manual Rx 2    Manual Rx 2 Location cx upglides/sideglides emphasis on for L rotation  -      Manual Rx 2 Type L rotation isometrics 10x 5 sec  -              Manual Rx 3    Manual Rx 3 Location prone thoracic PA mobs  -      Manual Rx 3 Type periscapular STM  -            User Key  (r) = Recorded By, (t) = Taken By, (c) = Cosigned By    Initials Name Provider Type     Katy Schneider, PT Physical Therapist                 PT OP Goals     Row Name 03/15/22 1500          PT Short Term Goals    STG Date to Achieve 03/18/22  -     STG 1 Pt will be independent with initial HEP for cervical spine/core postural strengthening and cervical AROM.  -     STG 1 Progress Ongoing  -     STG 2 Pt will demonstrate correct posture in sitting and standing.  -     STG 2 Progress Ongoing  [facility]     STG 3 Pt will demonstrate correct body mechanics with ADL’s and work activity to decrease strain on spine.  -     STG 3 Progress Ongoing  [facility]     STG 4 Pt will be able to initiate cervical flexion to 25%.  -     STG 4 Progress Ongoing  [facility]            Long Term Goals    LTG Date to Achieve 04/17/22  -     LTG 1 Pt will be independent with advanced HEP for spinal stabilization, core and UE strengthening.  -     LTG 1 Progress Ongoing  -     LTG 2 Pt will be able to move through 50-75% of full cervical AROM without pain or radiating symptoms.  -     LTG 2 Progress Ongoing  [facility]     LTG 3 Pt's score  will decrease by 10% or less on NDI indicating decrease in perceived functional disability.  -     LTG 3 Progress Ongoing  -     LTG 4 Pt will report decrease in overall pain by 50% or better.  -     LTG 4 Progress Ongoing  -           User Key  (r) = Recorded By, (t) = Taken By, (c) = Cosigned By    Initials Name Provider Type     Katy Schneider, PT Physical Therapist                Therapy Education  Given: Symptoms/condition management, Posture/body mechanics  Program: Reinforced  How Provided: Verbal  Provided to: Patient  Level of Understanding: Verbalized, Demonstrated              Time Calculation:   Start Time: 1533  Stop Time: 1612  Time Calculation (min): 39 min  Total Timed Code Minutes- PT: 39 minute(s)  Timed Charges   - PT Therapeutic Exercise Minutes: 14  89262 - PT Manual Therapy Minutes: 25  Total Minutes  Timed Charges Total Minutes: 39   Total Minutes: 39  Therapy Charges for Today     Code Description Service Date Service Provider Modifiers Qty    01491103573  PT MANUAL THERAPY EA 15 MIN 3/15/2022 Katy Schneider, PT GP 2    [id number]  PT THER PROC EA 15 MIN 3/15/2022 Katy Schneider, PT GP 1                    Katy Schneider PT  3/15/2022
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
## 2022-03-22 ENCOUNTER — APPOINTMENT (OUTPATIENT)
Dept: PHYSICAL THERAPY | Facility: HOSPITAL | Age: 47
End: 2022-03-22

## 2022-03-22 ENCOUNTER — HOSPITAL ENCOUNTER (OUTPATIENT)
Dept: PHYSICAL THERAPY | Facility: HOSPITAL | Age: 47
Setting detail: THERAPIES SERIES
Discharge: HOME | End: 2022-03-22

## 2022-03-22 ENCOUNTER — HOSPITAL ENCOUNTER (OUTPATIENT)
Dept: SPEECH THERAPY | Facility: HOSPITAL | Age: 47
Setting detail: THERAPIES SERIES
Discharge: HOME | End: 2022-03-22

## 2022-03-22 ENCOUNTER — HOSPITAL ENCOUNTER (OUTPATIENT)
Dept: OCCUPATIONAL THERAPY | Facility: HOSPITAL | Age: 47
Setting detail: THERAPIES SERIES
Discharge: HOME | End: 2022-03-22

## 2022-03-22 DIAGNOSIS — R41.841 COGNITIVE COMMUNICATION DEFICIT: ICD-10-CM

## 2022-03-22 DIAGNOSIS — M54.2 PAIN OF NECK WITH RECENT TRAUMATIC INJURY: ICD-10-CM

## 2022-03-22 DIAGNOSIS — M53.9 CERVICAL DYSFUNCTION: Primary | ICD-10-CM

## 2022-03-22 DIAGNOSIS — S06.9X0A TRAUMATIC BRAIN INJURY WITHOUT LOSS OF CONSCIOUSNESS, INITIAL ENCOUNTER: Primary | ICD-10-CM

## 2022-03-22 DIAGNOSIS — R53.1 GENERALIZED WEAKNESS: ICD-10-CM

## 2022-03-22 PROCEDURE — 97530 THERAPEUTIC ACTIVITIES: CPT | Performed by: OCCUPATIONAL THERAPIST

## 2022-03-22 PROCEDURE — 97130 THER IVNTJ EA ADDL 15 MIN: CPT

## 2022-03-22 PROCEDURE — 97140 MANUAL THERAPY 1/> REGIONS: CPT

## 2022-03-22 PROCEDURE — 97110 THERAPEUTIC EXERCISES: CPT | Performed by: OCCUPATIONAL THERAPIST

## 2022-03-22 PROCEDURE — 97129 THER IVNTJ 1ST 15 MIN: CPT

## 2022-03-22 PROCEDURE — 97110 THERAPEUTIC EXERCISES: CPT
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
## 2022-03-29 ENCOUNTER — APPOINTMENT (OUTPATIENT)
Dept: PHYSICAL THERAPY | Facility: HOSPITAL | Age: 47
End: 2022-03-29

## 2022-03-29 ENCOUNTER — HOSPITAL ENCOUNTER (OUTPATIENT)
Dept: OCCUPATIONAL THERAPY | Facility: HOSPITAL | Age: 47
Setting detail: THERAPIES SERIES
Discharge: HOME | End: 2022-03-29

## 2022-03-29 ENCOUNTER — HOSPITAL ENCOUNTER (OUTPATIENT)
Dept: SPEECH THERAPY | Facility: HOSPITAL | Age: 47
Setting detail: THERAPIES SERIES
Discharge: HOME | End: 2022-03-29

## 2022-03-29 ENCOUNTER — HOSPITAL ENCOUNTER (OUTPATIENT)
Dept: PHYSICAL THERAPY | Facility: HOSPITAL | Age: 47
Setting detail: THERAPIES SERIES
Discharge: HOME | End: 2022-03-29

## 2022-03-29 DIAGNOSIS — R41.841 COGNITIVE COMMUNICATION DEFICIT: ICD-10-CM

## 2022-03-29 DIAGNOSIS — M53.9 CERVICAL DYSFUNCTION: Primary | ICD-10-CM

## 2022-03-29 DIAGNOSIS — S06.9X0A TRAUMATIC BRAIN INJURY WITHOUT LOSS OF CONSCIOUSNESS, INITIAL ENCOUNTER: Primary | ICD-10-CM

## 2022-03-29 DIAGNOSIS — R53.1 GENERALIZED WEAKNESS: ICD-10-CM

## 2022-03-29 DIAGNOSIS — M54.2 PAIN OF NECK WITH RECENT TRAUMATIC INJURY: ICD-10-CM

## 2022-03-29 PROCEDURE — 97110 THERAPEUTIC EXERCISES: CPT

## 2022-03-29 PROCEDURE — 97110 THERAPEUTIC EXERCISES: CPT | Performed by: OCCUPATIONAL THERAPIST

## 2022-03-29 PROCEDURE — 97530 THERAPEUTIC ACTIVITIES: CPT | Performed by: OCCUPATIONAL THERAPIST

## 2022-03-29 PROCEDURE — 97140 MANUAL THERAPY 1/> REGIONS: CPT

## 2022-03-29 PROCEDURE — 97130 THER IVNTJ EA ADDL 15 MIN: CPT

## 2022-03-29 PROCEDURE — 97129 THER IVNTJ 1ST 15 MIN: CPT

## 2022-03-29 NOTE — THERAPY DISCHARGE NOTE
Outpatient Occupational Therapy Neuro Treatment Note/Discharge Summary  [facility]     Patient Name: Lisandra Muniz  : 1975  MRN: [id number]  Today's Date: 3/29/2022      Visit Date: 2022    There is no problem list on file for this patient.       No past medical history on file.     No past surgical history on file.      Visit Dx:    ICD-10-CM ICD-9-CM   1. 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 Generalized weakness  R53.1 780.79           Hand Therapy (last 24 hours)     Hand Eval     Row Name 22 1400              Strength Right    Right  Test 1 22  -KP       Strength Average Right 22  -KP               Strength Left    Left  Test 1 20  -KP       Strength Average Left 20  -KP              Right Hand Strength - Pinch (lbs)    Lateral 9 lbs  -KP              Left Hand Strength - Pinch (lbs)    Lateral 11 lbs  -KP            User Key  (r) = Recorded By, (t) = Taken By, (c) = Cosigned By    Initials Name Provider Type    Alesha Carreon, OTR Occupational Therapist                     OT Assessment/Plan     Row Name 22 1529          OT Assessment    Assessment Comments pt worked w OT this date in tx session and completed dc tests. Pt completed GMC/FMC tests and was close to the same w FMC testing w a score of 19 seconds w R hand pegs and 22 w L hand pegs, where initial eval she scored the same on the R side w 19 seconds and scored w more time needed on L 26 seconds, therefore she improved her FMC score w pegs at dc compared to inital eval. pt scored better on the box and blocks at initial eval, but this date she had an incr in pain in her neck and L shoulder areas. which could cause her GMC scores to not improve as much as anticipated. Her  strength was the same at inital as dc w 22 pounds on the R, and was one less on the L 20  and at initial was 21 , but not much of a difference. pt  worked on her standing tolerance this date w tasks x 10 min and has met this goal for dc. Pt completed stretches to forearms and upper back, nerve glides, shoulder shrugs, and theraputty act to incr hand/finger strength. pt is independent w her HEP and had no further questions. pt agrees w dc and will cont her HEP at home.  -           User Key  (r) = Recorded By, (t) = Taken By, (c) = Cosigned By    Initials Name Provider Type    Alesha Carreon OTR Occupational Therapist                  OT Goals     Row Name 03/29/22 1500          OT Short Term Goals    STG Date to Achieve 03/29/22  -     STG 1 Pt to be independent with UE strengthening HEP.  -     STG 1 Progress Met  -     STG 2 Pt to be independent with UE coordination HEP.  -     STG 2 Progress Met  -            Long Term Goals    LTG 2 Pt to demo improved strength with bilateral elbows to >/= 4/5.  -     LTG 2 Progress Met  -     LTG 3 Pt to demo independence with use of daily planner/calendar for keeping track of appointments, work duties, etc.  -     LTG 3 Progress Met  -     LTG 4 Pt to demo appropriate body mechanics during lifting objects, housekeeping tasks, etc. to increase ease and decrease pain during completion of daily functional tasks.  -     LTG 4 Progress Met  -     LTG 5 Pt to complete moderately challenging task at standing level for >/= 10 minutes to increase activity tolerance for ADL/IADLs.  -     LTG 5 Progress Met  -     LTG 6 Pt to score >/= 50/80 on Upper Extremity Functional Index to indicate improved functional use of BUEs.  -     LTG 6 Progress --  NT at dc  -     LTG 7 Pt to be independent with ROM and strengthening for LUE.  -     LTG 7 Progress Met  -           User Key  (r) = Recorded By, (t) = Taken By, (c) = Cosigned By    Initials Name Provider Type    Alesha Carreon OTR Occupational Therapist                Therapy Education  Education Details: pt ed to cont her HEP  "as she is being dc today.. pt understands and agrees.  Given: HEP  Program: Reinforced  How Provided: Verbal, Demonstration  Provided to: Patient  Level of Understanding: Teach back education performed, Verbalized, Demonstrated         OT Exercises     Row Name 03/29/22 1400             Subjective Comments    Subjective Comments \"I do my HEP and exercises/stretches throughout the day\"  -KP              Subjective Pain    Able to rate subjective pain? yes  -KP      Pre-Treatment Pain Level 7  -KP      Post-Treatment Pain Level 7  -KP      Subjective Pain Comment upper traps, R side of neck and L shoulder  -KP              Exercise 1    Exercise Name 1 ulnar and median nerve glides at beginning of session and throughout as needed  -KP      Cueing 1 Verbal;Demo  -KP      Sets 1 3  -KP      Reps 1 10  -KP              Exercise 2    Exercise Name 2 stretches to forearm beginning and end of session  -KP      Cueing 2 Verbal;Demo  -KP              Exercise 3    Exercise Name 3 stretches in door way to UEs/shoulders at end of session  -KP      Cueing 3 Verbal;Demo  -KP              Exercise 4    Exercise Name 4 standing act x10 min w pegs, cones, and chinese checkers to incr standing tolerance  to incr ADL/IADLS.  -KP              Exercise 5    Exercise Name 5 shoulder shrugs/scapular elevation/depression to B  sh  -KP      Cueing 5 Verbal;Demo  -KP      Sets 5 2  -KP      Reps 5 10  -KP              Exercise 6    Exercise Name 6 rsistive putty exercise B hands  -KP      Cueing 6 Verbal;Demo  -KP      Equipment 6 Theraputty  -KP              Exercise 7    Exercise Name 7 sent blue foam block home w pt so she will have to when completing ex at home for HEP  -KP            User Key  (r) = Recorded By, (t) = Taken By, (c) = Cosigned By    Initials Name Provider Type    Alesha Carreon, OTR Occupational Therapist                    9 Hole Peg  9-Hole Peg Left: 22  9-Hole Peg Right: 19  Box and Blocks  Box and Blocks " Left: 34  Box and Blocks Right: 33         Time Calculation:   OT Start Time: 1345  OT Stop Time: 1430  OT Time Calculation (min): 45 min  Total Timed Code Minutes- OT: 45 minute(s)  Timed Charges  49861 - OT Therapeutic Exercise Minutes: 15  76744 - OT Therapeutic Activity Minutes: 30  Total Minutes  Timed Charges Total Minutes: 45   Total Minutes: 45     Therapy Charges for Today     Code Description Service Date Service Provider Modifiers Qty    46156498293 HC OT THER PROC EA 15 MIN 3/29/2022 Alesha Molina OTR GO 1    24902333135 HC OT THERAPEUTIC ACT EA 15 MIN 3/29/2022 Alesha Molina OTR GO 2                OP OT Discharge Summary  Date of Discharge: 03/29/22  Reason for Discharge: All goals achieved, Independent  Outcomes Achieved: Able to achieve all goals within established timeline  Discharge Instructions: pt to cont her HEP at home with her stretches, foam block, theraputty and exercises.        ASIM Edouard  3/29/2022

## 2022-03-29 NOTE — THERAPY TREATMENT NOTE
Outpatient Speech Language Pathology   Adult Speech Language Cognitive Treatment Note  [facility]     Patient Name: Lisandra Muniz  : 1975  MRN: [id number]  Today's Date: 3/29/2022         Visit Date: 2022   There is no problem list on file for this patient.         Visit Dx:    ICD-10-CM ICD-9-CM   1. 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 Cognitive communication deficit  R41.841 799.52        OP SLP Assessment/Plan - 22 1613        SLP Assessment    Functional Problems Speech Language- Adult/Cognition  -AL    Clinical Impression: Speech Language-Adult/Congnition Mild:;Cognitive Communication Impairment  -AL    Prognosis Excellent (comment)  -AL       SLP Plan    Plan Comments Continue current plan of care.  -AL          User Key  (r) = Recorded By, (t) = Taken By, (c) = Cosigned By    Initials Name Provider Type    Elsa Hays MS CCC-SLP Speech and Language Pathologist                 Patient was wearing a face mask during this therapy encounter. Therapist used appropriate personal protective equipment including mask, eye protection and gloves.  Mask used was standard procedure mask. Appropriate PPE was worn during the entire therapy session. Hand hygiene was completed before and after therapy session. Patient is not in enhanced droplet precautions.                              SLP OP Goals     Row Name 22 1315          Subjective Comments    Subjective Comments Pt arrived 15 minutes late for appointment. Pt participated well. She reported she still has moments of feeling overwhelmed and having difficulty processing in heavy traffic and in conversations with multiple people.  -AL            Subjective Pain    Able to rate subjective pain? yes  -AL     Pre-Treatment Pain Level 0  -AL     Post-Treatment Pain Level 0  -AL            Verbal Expression Goals    Patient will be able to use verbal expressive language skills  to communicate effectively in social/avocational/work setting 100%:;without cues  -AL     Status: Patient will be able to use verbal expressive language skills to communicate effectively in social/avocational/work setting Progressing as expected  -AL     Patient will improve verbal expressive language skills by completing divergent naming tasks 100%:;without cues  -AL     Status: Patient will improve verbal expressive language skills by completing divergent naming tasks Progressing as expected  -AL     Comments: Patient will improve verbal expressive language skills by completing divergent naming tasks Little items: 10 with NO cues. Heavy items: 6 with NO cues in 1 minute, 8 with additional time.  -AL            Memory Goals    Status: Patient will be able to remember information needed to participate in avocational activities Progressing as expected  -AL            Attention/Orientation Goals    Patient will be able to execute all activities necessary to manage a home Independently  -AL     Status: Patient will be able to execute all activities necessary to manage a home Progressing as expected  -AL     Patient will improve attention skills by sustaining focus to high-level cognitive tasks in order to complete task 100%:;without cues  -AL     Status: Patient will improve attention skills by sustaining focus to high-level cognitive tasks in order to complete task Achieved  -AL     Comments: Patient will improve attention skills by sustaining focus to high-level cognitive tasks in order to complete task Pt completed complex logic puzzle with 100% accuracy with NO cues for sustained attention or attention to detail.  -AL            Other Goals    Other Adult Goal- 1 Pt will complete high-level verbal working memory tasks with 80% accuracy with NO cues.  -AL     Status: Other Adult Goal- 1 Progressing as expected  -AL     Comments: Other Adult Goal- 1 4 words alphabetized: 60% with NO cues, 90% with MIN cues, 100% with  MAX cues.  -AL           User Key  (r) = Recorded By, (t) = Taken By, (c) = Cosigned By    Initials Name Provider Type    Elsa Hays, MS CCC-SLP Speech and Language Pathologist               OP SLP Education     Row Name 03/29/22 1613       Education    Barriers to Learning No barriers identified  -AL    Learning Motivation Strong  -AL    Learning Method Explanation  -AL    Teaching Response Verbalized understanding  -AL    Education Comments Discussed pt's progress toward cognitive goals.  -AL          User Key  (r) = Recorded By, (t) = Taken By, (c) = Cosigned By    Initials Name Effective Dates    Elsa Hays, MS CCC-SLP 06/16/21 -                      Time Calculation:   SLP Start Time: 1315  SLP Stop Time: 1345  SLP Time Calculation (min): 30 min  Timed Charges  39236-HG Dev of Cogn Skills Initial Minutes: 15  63924-CF Dev of Cogn Skills Add Minutes: 15  Total Minutes  Timed Charges Total Minutes: 30   Total Minutes: 30    Therapy Charges for Today     Code Description Service Date Service Provider Modifiers Qty    40639744148 HC ST DEV OF COGN SKILLS INITIAL 15 MIN 3/29/2022 Elsa Allen, MS CCC-SLP  1    25961749927 HC ST DEV OF COGN SKILLS EACH ADDT'L 15 MIN 3/29/2022 Elsa Allen MS CCC-SLP  1                   Elsa Allen MS CCC-SLP  3/29/2022

## 2022-03-30 NOTE — THERAPY TREATMENT NOTE
Outpatient Physical Therapy Ortho Treatment Note  [facility]     Patient Name: Lisandra Muniz  : 1975  MRN: [id number]  Today's Date: 3/29/2022      Visit Date: 2022    Visit Dx:    ICD-10-CM ICD-9-CM   1. Cervical dysfunction  M53.9 723.9   2. Pain of neck with recent traumatic injury  M54.2 723.1       There is no problem list on file for this patient.       No past medical history on file.     No past surgical history on file.          22 1600   PT Assessment   Assessment Comments Pt states she tolerated last session better, less pain later.  She reports increasing awareness of poture/positiioning as well as activity and how it affects her pain and fatigue.  We discussed importance of pacing activity in general and to break up activity that causes increased stress/strain on her neck/body.  We were unable to progress her program today due to considerable fatigue following her OT session today. Focused on posture and body mechanics and activity awareness and modification and manual techniques. Progressing toward goals.   PT Plan   PT Plan Comments assess response to last visit.           22 1500   Subjective Comments   Subjective Comments Just came from OT, it was my last day.  Turning my head left irritates the pain.  Looking up iritates it and reaching up high does as well.  Bending to  something or reaching into the dryer.   Subjective Pain   Able to rate subjective pain? yes   Pre-Treatment Pain Level 7   Total Minutes   65585 - PT Therapeutic Exercise Minutes 15   Exercise 1   Exercise Name 1 supine chin tuck + lift   Cueing 1 Verbal;Demo   Reps 1 10   Exercise 2   Exercise Name 2 supine scap ret   Cueing 2 Verbal;Demo   Reps 2 15   Exercise 3   Exercise Name 3 reviewed stabilization in supine chin tuck held throughout shld flexion noel and alternating   Sets 3 stoppped due to UE fatigue from OT session prior to PT   Reps 3 resume next viit   Exercise 4   Exercise Name 4  added gentle SAP in short sets to reduce strain from fatigue   Cueing 4 Verbal;Demo   Sets 4 2   Reps 4 5   Additional Comments gentle chin tuck held throughout   Exercise 7   Exercise Name 7 seated UT stretch   Reps 7 performed with OT   Exercise 8   Exercise Name 8 seated LS stretch   Reps 8 performed w/OT   Exercise 9   Exercise Name 9 supine cervical rotation w/head supported on pillow   Reps 9 5 ea   Additional Comments stay in subpain range   Exercise 10   Exercise Name 10 posture and body mechanics with ADLs   Time 10 8 min                                Manual Rx (last 36 hours)     Manual Treatments     Row Name 03/29/22 1500             Total Minutes    91220 - PT Manual Therapy Minutes 23  -JA              Manual Rx 1    Manual Rx 1 Location STM upper traps, LS; sub occipital release  -LAWRENCE              Manual Rx 2    Manual Rx 2 Location manual cervical traction  -LAWRENCE      Manual Rx 2 Type gentle  -LAWRENCE            User Key  (r) = Recorded By, (t) = Taken By, (c) = Cosigned By    Initials Name Provider Type    Yuko Byrd, PT Physical Therapist                     03/29/22 1500   PT Short Term Goals   STG Date to Achieve 03/18/22   STG 1 Pt will be independent with initial HEP for cervical spine/core postural strengthening and cervical AROM.   STG 1 Progress Met   STG 2 Pt will demonstrate correct posture in sitting and standing.   STG 2 Progress Progressing   STG 3 Pt will demonstrate correct body mechanics with ADL’s and work activity to decrease strain on spine.   STG 3 Progress Progressing   STG 4 Pt will be able to initiate cervical flexion to 25%.   STG 4 Progress Progressing   Long Term Goals   LTG Date to Achieve 04/17/22   LTG 1 Pt will be independent with advanced HEP for spinal stabilization, core and UE strengthening.   LTG 1 Progress Ongoing   LTG 2 Pt will be able to move through 50-75% of full cervical AROM without pain or radiating symptoms.   LTG 2 Progress Ongoing   LTG 3 Pt's  score will decrease by 10% or less on NDI indicating decrease in perceived functional disability.   LTG 3 Progress Ongoing   LTG 4 Pt will report decrease in overall pain by 50% or better.   LTG 4 Progress Ongoing                    Time Calculation:   Start Time: 1505  Stop Time: 1545  Time Calculation (min): 40 min  Timed Charges  93587 - PT Therapeutic Exercise Minutes: 15  14119 - PT Manual Therapy Minutes: 23  Total Minutes  Timed Charges Total Minutes: 38   Total Minutes: 38  Therapy Charges for Today     Code Description Service Date Service Provider Modifiers Qty    01960082082 HC PT THER PROC EA 15 MIN 3/29/2022 Yuko Dillard, PT GP 1    33045734114 HC PT MANUAL THERAPY EA 15 MIN 3/29/2022 Yuko Dillard, PT GP 2                    Yuko Dillard PT  3/29/2022

## 2022-04-05 ENCOUNTER — APPOINTMENT (OUTPATIENT)
Dept: SPEECH THERAPY | Facility: HOSPITAL | Age: 47
End: 2022-04-05

## 2022-04-05 ENCOUNTER — APPOINTMENT (OUTPATIENT)
Dept: OCCUPATIONAL THERAPY | Facility: HOSPITAL | Age: 47
End: 2022-04-05

## 2022-04-05 ENCOUNTER — APPOINTMENT (OUTPATIENT)
Dept: PHYSICAL THERAPY | Facility: HOSPITAL | Age: 47
End: 2022-04-05

## 2022-04-12 ENCOUNTER — HOSPITAL ENCOUNTER (OUTPATIENT)
Dept: PHYSICAL THERAPY | Facility: HOSPITAL | Age: 47
Setting detail: THERAPIES SERIES
Discharge: HOME | End: 2022-04-12

## 2022-04-12 ENCOUNTER — HOSPITAL ENCOUNTER (OUTPATIENT)
Dept: SPEECH THERAPY | Facility: HOSPITAL | Age: 47
Setting detail: THERAPIES SERIES
Discharge: HOME | End: 2022-04-12

## 2022-04-12 ENCOUNTER — APPOINTMENT (OUTPATIENT)
Dept: OCCUPATIONAL THERAPY | Facility: HOSPITAL | Age: 47
End: 2022-04-12

## 2022-04-12 DIAGNOSIS — M54.2 PAIN OF NECK WITH RECENT TRAUMATIC INJURY: ICD-10-CM

## 2022-04-12 DIAGNOSIS — S06.9X0A TRAUMATIC BRAIN INJURY WITHOUT LOSS OF CONSCIOUSNESS, INITIAL ENCOUNTER: Primary | ICD-10-CM

## 2022-04-12 DIAGNOSIS — M53.9 CERVICAL DYSFUNCTION: Primary | ICD-10-CM

## 2022-04-12 DIAGNOSIS — R41.841 COGNITIVE COMMUNICATION DEFICIT: ICD-10-CM

## 2022-04-12 PROCEDURE — 97140 MANUAL THERAPY 1/> REGIONS: CPT

## 2022-04-12 PROCEDURE — 97129 THER IVNTJ 1ST 15 MIN: CPT

## 2022-04-12 PROCEDURE — 97110 THERAPEUTIC EXERCISES: CPT

## 2022-04-12 PROCEDURE — 97130 THER IVNTJ EA ADDL 15 MIN: CPT

## 2022-04-12 NOTE — THERAPY TREATMENT NOTE
"    Outpatient Physical Therapy Ortho Treatment Note  [facility]     Patient Name: Lisandra Muniz  : 1975  MRN: [id number]  Today's Date: 2022      Visit Date: 2022    Visit Dx:    ICD-10-CM ICD-9-CM   1. Cervical dysfunction  M53.9 723.9   2. Pain of neck with recent traumatic injury  M54.2 723.1       There is no problem list on file for this patient.       No past medical history on file.     No past surgical history on file.                     PT Assessment/Plan     Row Name 22 1500          PT Assessment    Assessment Comments Lisandra presents with c/o R-sided neck pain, tension, and feeling of \"bobble-head\" sensation sometimes.  We reviewed anatomy and importance of spinal stabilization through core strengthening of spinal stabilizers (low region hip girdle and abs, mid region dorsal scapular muscles, and neck deep flexors and pvm).  Revised her HEP to a circuit-style strengthening/stabilization with focus on good alignment and small, controlled movements.  She demonstrated improved body awareness of position/tilt of pelvis and it's direct influence on upper spine position. Manual techniques performed to address soft tissues. She will benefit from continuing skilled therapy servcies.  -LAWRENCE            PT Plan    PT Plan Comments assess carryover with revised HEP, response to last visit; assess AROM, cont education for body mechanics wtih ADLs  -LAWRENCE           User Key  (r) = Recorded By, (t) = Taken By, (c) = Cosigned By    Initials Name Provider Type    Yuko Byrd, PT Physical Therapist                   OP Exercises     Row Name 22 1400             Subjective Comments    Subjective Comments I stopped taking the medicine because it made feel like I wasn't clear, pain management has referred me out.  I'm having pain/aches on R side on my head and my head feels like a bobble head at times. Getting some tingling in my arms sometimes.  -LAWRENCE              Total Minutes    " 61432 - PT Therapeutic Exercise Minutes 30  -JA      58344 - PT Manual Therapy Minutes 15  -JA              Exercise 1    Exercise Name 1 supine chin tuck + lift  -JA      Cueing 1 Verbal;Demo  -JA      Reps 1 reviewed  -JA              Exercise 2    Exercise Name 2 seated shrugs  -JA      Cueing 2 Verbal;Demo  -JA      Reps 2 pt reports she has been able to use for reducing UT tension  -JA              Exercise 3    Exercise Name 3 added STS w/goal of good postural alignment (not extending upper c-spine during the transition)  -JA      Cueing 3 Verbal;Demo  -JA      Sets 3 2  -JA      Reps 3 3  -JA      Additional Comments took several tries to achieve correct alignment with the movement  -JA              Exercise 4    Exercise Name 4 QPed TA  -JA      Cueing 4 Verbal;Tactile;Demo  -JA      Sets 4 2  -JA      Reps 4 3  -JA      Time 4 5sec  -JA              Exercise 5    Exercise Name 5 QPed alt UE (doesn't have to be full flexion) while maintaining correct head/neck alignment  -JA      Cueing 5 Verbal;Demo  -JA      Sets 5 2  -JA      Reps 5 3 R/L  -JA              Exercise 6    Exercise Name 6 Qped cerv retraction  -JA      Cueing 6 Verbal;Demo  -JA      Sets 6 2  -JA      Reps 6 3  -JA      Time 6 5sec  -JA              Exercise 7    Exercise Name 7 seated PPT to work on reducing tension in upper spine  -JA      Cueing 7 Verbal;Tactile;Demo  -JA      Sets 7 3  -JA      Reps 7 3  -JA      Time 7 5sec  -JA              Exercise 8    Exercise Name 8 seated TA with Pilates arms (arm low, elbows straight small oscillations)  -JA      Cueing 8 Verbal;Demo  -JA      Sets 8 2  -JA      Reps 8 10 oscillations  -JA              Exercise 9    Exercise Name 9 --  -JA      Reps 9 --  -JA              Exercise 10    Exercise Name 10 --  -JA      Time 10 --  -JA            User Key  (r) = Recorded By, (t) = Taken By, (c) = Cosigned By    Initials Name Provider Type    Yuko Byrd, PT Physical Therapist                          Manual Rx (last 36 hours)     Manual Treatments     Row Name 04/12/22 1400             Total Minutes    64020 - PT Manual Therapy Minutes 15  -LAWRENCE              Manual Rx 1    Manual Rx 1 Location STM upper traps, LS; sub occipital release  -      Manual Rx 1 Type gentle UT stretch noel x2, gentle rotation PROM  -LAWRENCE              Manual Rx 2    Manual Rx 2 Location manual cervical traction  -      Manual Rx 2 Type gentle  -LAWRENCE            User Key  (r) = Recorded By, (t) = Taken By, (c) = Cosigned By    Initials Name Provider Type    Yuko Byrd, PT Physical Therapist                 PT OP Goals     Row Name 04/12/22 1500          PT Short Term Goals    STG Date to Achieve 03/18/22  -LAWRENCE     STG 1 Pt will be independent with initial HEP for cervical spine/core postural strengthening and cervical AROM.  -LAWRENCE     STG 1 Progress Met  -LAWRENCE     STG 2 Pt will demonstrate correct posture in sitting and standing.  -LAWRENCE     STG 2 Progress Progressing;Partially Met  -LAWRENCE     STG 2 Progress Comments after working on seated pelvic tilt today she demonstrated better posture and good understanding of correct sitting posture  -     STG 3 Pt will demonstrate correct body mechanics with ADL’s and work activity to decrease strain on spine.  -LAWRENCE     STG 3 Progress Progressing  -     STG 3 Progress Comments worked on maintaining good alignment during sit to stand  -     STG 4 Pt will be able to initiate cervical flexion to 25%.  -LAWRENCE     STG 4 Progress Progressing  -LAWRENCE            Long Term Goals    LTG Date to Achieve 04/17/22  -LAWRENCE     LTG 1 Pt will be independent with advanced HEP for spinal stabilization, core and UE strengthening.  -LAWRENCE     LTG 1 Progress Ongoing  -LAWRENCE     LTG 2 Pt will be able to move through 50-75% of full cervical AROM without pain or radiating symptoms.  -LAWRENCE     LTG 2 Progress Ongoing  -     LTG 3 Pt's score will decrease by 10% or less on NDI indicating decrease in perceived functional  disability.  -LAWRENCE     LTG 3 Progress Ongoing  -LAWRENCE     LTG 4 Pt will report decrease in overall pain by 50% or better.  -LAWRENCE     LTG 4 Progress Ongoing  -LAWRENCE           User Key  (r) = Recorded By, (t) = Taken By, (c) = Cosigned By    Initials Name Provider Type    Yuko Byrd PT Physical Therapist                Therapy Education  Education Details: [id number] - Disucssed spinal stabilization components/regions and importance of posture/alignment and pain relief.  Revised HEP to begin a circuit-type program performing 1-2 sets of 3 reps each of spinal stabilization ex's.  Given: HEP, Symptoms/condition management, Pain management, Posture/body mechanics, Mobility training  Program: Progressed  How Provided: Verbal, Demonstration, Written  Provided to: Patient  Level of Understanding: Verbalized, Demonstrated              Time Calculation:   Start Time: 1419  Stop Time: 1505  Time Calculation (min): 46 min  Timed Charges  68265 - PT Therapeutic Exercise Minutes: 30  76593 - PT Manual Therapy Minutes: 15  Total Minutes  Timed Charges Total Minutes: 45   Total Minutes: 45  Therapy Charges for Today     Code Description Service Date Service Provider Modifiers Qty    58100734545  PT THER PROC EA 15 MIN 4/12/2022 Yuko Dillard, PT GP 2    67972620093 HC PT MANUAL THERAPY EA 15 MIN 4/12/2022 Yuko Dillard PT GP 1                    Yuko Dillard PT  4/12/2022

## 2022-04-12 NOTE — THERAPY TREATMENT NOTE
Outpatient Speech Language Pathology   Adult Speech Language Cognitive Treatment Note  [facility]     Patient Name: Lisandra Muniz  : 1975  MRN: [id number]  Today's Date: 2022         Visit Date: 2022   There is no problem list on file for this patient.         Visit Dx:    ICD-10-CM ICD-9-CM   1. 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 Cognitive communication deficit  R41.841 799.52        OP SLP Assessment/Plan - 22 1546        SLP Assessment    Functional Problems Speech Language- Adult/Cognition  -AL    Impact on Function: Adult Speech Language/Cognition Poor attention to task;Trouble learning or remembering new information  -AL    Clinical Impression: Speech Language-Adult/Congnition Mild:;Cognitive Communication Impairment  -AL    Prognosis Good (comment)  -AL    Patient/caregiver participated in establishment of treatment plan and goals Yes  -AL    Patient would benefit from skilled therapy intervention Yes  -AL       SLP Plan    Frequency 1x week  -AL    Duration 4 weeks  -AL    Planned CPT's? SLP DEV COG SKILLS INITIAL (15 MIN) : 19528;SLP DEV COG SKILLS ADD (15 MIN) : 73492  -AL    Expected Duration of Therapy Session (SLP Eval) 45  -AL          User Key  (r) = Recorded By, (t) = Taken By, (c) = Cosigned By    Initials Name Provider Type    Elsa Hays MS CCC-SLP Speech and Language Pathologist              Patient was wearing a face mask during this therapy encounter. Therapist used appropriate personal protective equipment including mask, eye protection and gloves.  Mask used was standard procedure mask. Appropriate PPE was worn during the entire therapy session. Hand hygiene was completed before and after therapy session. Patient is not in enhanced droplet precautions.                                SLP OP Goals     Row Name 22 1303          Subjective Comments    Subjective Comments Pt participated  "well in session. She became tearful when talking about feeling overwhelmed with her pain and difficulty concentrating. She stated she has been taking some pain medicine at night but does not like to take it during the day because it makes her feel \"like a zombie.\"  -AL            Subjective Pain    Subjective Pain Comment Pt reported neck pain, but she did not rate.  -AL            Written Language Expression Goals    Patient will be able to use written expressive language skills to communicate effectively in social/avocational/work setting 100%:;without cues  -AL     Status: Patient will be able to use written expressive language skills to communicate effectively in social/avocational/work setting Progressing as expected  -AL     Comments: Patient will be able to use written expressive language skills to communicate effectively in social/avocational/work setting Pt is making steady progress toward graphic expression goal, writing less common words with 70% accuracy with NO cues, MIN-MOD cues to correct letter reversals and omissions.  -AL     Patient will improve written language skills by writing dictated short words 100%:;without cues  -AL     Status: Patient will improve written language skills by writing dictated short words Progressing as expected  -AL     Comments: Patient will improve written language skills by writing dictated short words Not addressed this session due to time constraints.  -AL            Verbal Expression Goals    Patient will be able to use verbal expressive language skills to communicate effectively in social/avocational/work setting 100%:;without cues  -AL     Status: Patient will be able to use verbal expressive language skills to communicate effectively in social/avocational/work setting Progressing as expected  -AL     Comments: Patient will be able to use verbal expressive language skills to communicate effectively in social/avocational/work setting Pt is making steady progress " toward verbal expression goal, inconsistently requiring MIN cues to complete abstract divergent thinking tasks.  -AL     Patient will improve verbal expressive language skills by completing divergent naming tasks 100%:;without cues  -AL     Status: Patient will improve verbal expressive language skills by completing divergent naming tasks Progressing as expected  -AL     Comments: Patient will improve verbal expressive language skills by completing divergent naming tasks Not addressed this session due to time constraints.  -AL            Memory Goals    Status: Patient will be able to remember information needed to participate in avocational activities Progressing as expected  -AL     Comments: Patient will be able to remember information needed to participate in avocational activities Pt is making inconsistent progress toward 3/3 memory goals, requiring MIN cues to complete 7 digit span repetition, MIN cues to complete working memory tasks and occasional MIN cues to recall a complex paragraph.  -AL     Patient will demonstrate improved ability to recall information by immediately recalling a series of words 90%:;without cues  -AL     Status: Patient will demonstrate improved ability to recall information by immediately recalling a series of words Progressing as expected  -AL     Comments: Patient will demonstrate improved ability to recall information by immediately recalling a series of words 7 digit span repetition: 15% with NO cues, 85% with MIN cues, 100% with MAX cues.  -AL     Patient will demonstrate improved ability to recall information by listening to paragraph and answering yes/no questions 90%:;without cues  -AL     Status: Patient will demonstrate improved ability to recall information by listening to paragraph and answering yes/no questions Progressing as expected  -AL     Comments: Patient will demonstrate improved ability to recall information by listening to paragraph and answering yes/no questions  "Voicemails (complex paragraph): 80% with NO cues, 100% with MIN cues.  -AL            Attention/Orientation Goals    Patient will be able to execute all activities necessary to manage a home Independently  -AL     Status: Patient will be able to execute all activities necessary to manage a home Progressing as expected  -AL     Comments: Patient will be able to execute all activities necessary to manage a home Pt has made good progress toward attention goals, focusing on a complex logic puzzle for at least 20 minutes with NO cues for sustained attention or attention to detail. Alternating attention for card sort task appears mildly impaired. Pt reports difficulty concentrating during conversations and during her work day.  -AL     Patient will improve attention skills by sustaining focus to high-level cognitive tasks in order to complete task 100%:;without cues  -AL     Status: Patient will improve attention skills by sustaining focus to high-level cognitive tasks in order to complete task Achieved  -AL     Comments: Patient will improve attention skills by sustaining focus to high-level cognitive tasks in order to complete task Alternating attention for \"Blink\" card sort task: Pt sorted 25 cards in 1 minute, 26 seconds with 1 cue. Trial 2: Pt sorted cards in 1 minute, 25 seconds with NO cues.  -AL            Other Goals    Other Adult Goal- 1 Pt will complete high-level verbal working memory tasks with 80% accuracy with NO cues.  -AL     Status: Other Adult Goal- 1 Progressing as expected  -AL     Comments: Other Adult Goal- 1 4 words alphabetized: 50% with NO cues, 100% with MIN cues.  -AL           User Key  (r) = Recorded By, (t) = Taken By, (c) = Cosigned By    Initials Name Provider Type    Elsa Hays MS CCC-SLP Speech and Language Pathologist               OP SLP Education     Row Name 04/12/22 1547       Education    Barriers to Learning No barriers identified  -AL    Education Provided Patient " participated in establishing goals and treatment plan;Patient demonstrated recommended strategies  -AL    Assessed Learning needs;Learning preferences;Learning motivation;Learning readiness  -AL    Learning Motivation Strong  -AL    Learning Method Explanation  -AL    Teaching Response Verbalized understanding  -AL    Education Comments Discussed pt's progress toward cognitive goals. Pt acknowledges her progress, but states she still feels like her attention and memory deficits are impacting her daily life and would like to continue speech therapy.  -AL          User Key  (r) = Recorded By, (t) = Taken By, (c) = Cosigned By    Initials Name Effective Dates    Elsa Hays MS CCC-SLP 06/16/21 -               SLP Outcome Measures (last 72 hours)     SLP Outcome Measures     Row Name 04/12/22 1500             SLP Outcome Measures    Outcome Measure Used? Adult NOMS  -AL              Adult FCM Scores    FCM Chosen Verbal Expression;Memory  -AL      Verbal Expression FCM Score 6  -AL      Memory FCM Score 5  -AL            User Key  (r) = Recorded By, (t) = Taken By, (c) = Cosigned By    Initials Name Effective Dates    Elsa Hays MS CCC-SLP 06/16/21 -                    Time Calculation:   SLP Start Time: 1305  SLP Stop Time: 1345  SLP Time Calculation (min): 40 min  Timed Charges  04405-YS Dev of Cogn Skills Initial Minutes: 15  24459-WO Dev of Cogn Skills Add Minutes: 25  Total Minutes  Timed Charges Total Minutes: 40   Total Minutes: 40    Therapy Charges for Today     Code Description Service Date Service Provider Modifiers Qty    44954970420 HC ST DEV OF COGN SKILLS INITIAL 15 MIN 4/12/2022 Elsa Allen MS CCC-SLP  1    24728159227 HC ST DEV OF COGN SKILLS EACH ADDT'L 15 MIN 4/12/2022 Elsa Allen MS CCC-SLP  2                   Elsa Allen MS CCC-SLP  4/12/2022

## 2022-04-12 NOTE — THERAPY PROGRESS REPORT/RE-CERT
Outpatient Speech Language Pathology   Adult Speech Language Cognitive Progress Note  [facility]     Patient Name: Lisandra Muniz  : 1975  MRN: [id number]  Today's Date: 2022         Visit Date: 2022   There is no problem list on file for this patient.         Visit Dx:    ICD-10-CM ICD-9-CM   1. 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 Cognitive communication deficit  R41.841 799.52        OP SLP Assessment/Plan - 22 1546        SLP Assessment    Functional Problems Speech Language- Adult/Cognition  -AL    Impact on Function: Adult Speech Language/Cognition Poor attention to task;Trouble learning or remembering new information  -AL    Clinical Impression: Speech Language-Adult/Congnition Mild:;Cognitive Communication Impairment  -AL    Prognosis Good (comment)  -AL    Patient/caregiver participated in establishment of treatment plan and goals Yes  -AL    Patient would benefit from skilled therapy intervention Yes  -AL       SLP Plan    Frequency 1x week  -AL    Duration 4 weeks  -AL    Planned CPT's? SLP DEV COG SKILLS INITIAL (15 MIN) : 90148;SLP DEV COG SKILLS ADD (15 MIN) : 64346  -AL    Expected Duration of Therapy Session (SLP Eval) 45  -AL          User Key  (r) = Recorded By, (t) = Taken By, (c) = Cosigned By    Initials Name Provider Type    Elsa Hays MS CCC-SLP Speech and Language Pathologist                 Patient was wearing a face mask during this therapy encounter. Therapist used appropriate personal protective equipment including mask, eye protection and gloves.  Mask used was standard procedure mask. Appropriate PPE was worn during the entire therapy session. Hand hygiene was completed before and after therapy session. Patient is not in enhanced droplet precautions.                              SLP OP Goals     Row Name 22 1309          Subjective Comments    Subjective Comments Pt participated  "well in session. She became tearful when talking about feeling overwhelmed with her pain and difficulty concentrating. She stated she has been taking some pain medicine at night but does not like to take it during the day because it makes her feel \"like a zombie.\"  -AL            Subjective Pain    Subjective Pain Comment Pt reported neck pain, but she did not rate.  -AL            Written Language Expression Goals    Patient will be able to use written expressive language skills to communicate effectively in social/avocational/work setting 100%:;without cues  -AL     Status: Patient will be able to use written expressive language skills to communicate effectively in social/avocational/work setting Progressing as expected  -AL     Comments: Patient will be able to use written expressive language skills to communicate effectively in social/avocational/work setting Pt is making steady progress toward graphic expression goal, writing less common words with 70% accuracy with NO cues, MIN-MOD cues to correct letter reversals and omissions.  -AL     Patient will improve written language skills by writing dictated short words 100%:;without cues  -AL     Status: Patient will improve written language skills by writing dictated short words Progressing as expected  -AL     Comments: Patient will improve written language skills by writing dictated short words Not addressed this session due to time constraints.  -AL            Verbal Expression Goals    Patient will be able to use verbal expressive language skills to communicate effectively in social/avocational/work setting 100%:;without cues  -AL     Status: Patient will be able to use verbal expressive language skills to communicate effectively in social/avocational/work setting Progressing as expected  -AL     Comments: Patient will be able to use verbal expressive language skills to communicate effectively in social/avocational/work setting Pt is making steady progress " toward verbal expression goal, inconsistently requiring MIN cues to complete abstract divergent thinking tasks.  -AL     Patient will improve verbal expressive language skills by completing divergent naming tasks 100%:;without cues  -AL     Status: Patient will improve verbal expressive language skills by completing divergent naming tasks Progressing as expected  -AL     Comments: Patient will improve verbal expressive language skills by completing divergent naming tasks Not addressed this session due to time constraints.  -AL            Memory Goals    Status: Patient will be able to remember information needed to participate in avocational activities Progressing as expected  -AL     Comments: Patient will be able to remember information needed to participate in avocational activities Pt is making inconsistent progress toward 3/3 memory goals, requiring MIN cues to complete 7 digit span repetition, MIN cues to complete working memory tasks and occasional MIN cues to recall a complex paragraph.  -AL     Patient will demonstrate improved ability to recall information by immediately recalling a series of words 90%:;without cues  -AL     Status: Patient will demonstrate improved ability to recall information by immediately recalling a series of words Progressing as expected  -AL     Comments: Patient will demonstrate improved ability to recall information by immediately recalling a series of words 7 digit span repetition: 15% with NO cues, 85% with MIN cues, 100% with MAX cues.  -AL     Patient will demonstrate improved ability to recall information by listening to paragraph and answering yes/no questions 90%:;without cues  -AL     Status: Patient will demonstrate improved ability to recall information by listening to paragraph and answering yes/no questions Progressing as expected  -AL     Comments: Patient will demonstrate improved ability to recall information by listening to paragraph and answering yes/no questions  "Voicemails (complex paragraph): 80% with NO cues, 100% with MIN cues.  -AL            Attention/Orientation Goals    Patient will be able to execute all activities necessary to manage a home Independently  -AL     Status: Patient will be able to execute all activities necessary to manage a home Progressing as expected  -AL     Comments: Patient will be able to execute all activities necessary to manage a home Pt has made good progress toward attention goals, focusing on a complex logic puzzle for at least 20 minutes with NO cues for sustained attention or attention to detail. Alternating attention for card sort task appears mildly impaired. Pt reports difficulty concentrating during conversations and during her work day.  -AL     Patient will improve attention skills by sustaining focus to high-level cognitive tasks in order to complete task 100%:;without cues  -AL     Status: Patient will improve attention skills by sustaining focus to high-level cognitive tasks in order to complete task Achieved  -AL     Comments: Patient will improve attention skills by sustaining focus to high-level cognitive tasks in order to complete task Alternating attention for \"Blink\" card sort task: Pt sorted 25 cards in 1 minute, 26 seconds with 1 cue. Trial 2: Pt sorted cards in 1 minute, 25 seconds with NO cues.  -AL            Other Goals    Other Adult Goal- 1 Pt will complete high-level verbal working memory tasks with 80% accuracy with NO cues.  -AL     Status: Other Adult Goal- 1 Progressing as expected  -AL     Comments: Other Adult Goal- 1 4 words alphabetized: 50% with NO cues, 100% with MIN cues.  -AL           User Key  (r) = Recorded By, (t) = Taken By, (c) = Cosigned By    Initials Name Provider Type    Elsa Hays MS CCC-SLP Speech and Language Pathologist               OP SLP Education     Row Name 04/12/22 1547       Education    Barriers to Learning No barriers identified  -AL    Education Provided Patient " participated in establishing goals and treatment plan;Patient demonstrated recommended strategies  -AL    Assessed Learning needs;Learning preferences;Learning motivation;Learning readiness  -AL    Learning Motivation Strong  -AL    Learning Method Explanation  -AL    Teaching Response Verbalized understanding  -AL    Education Comments Discussed pt's progress toward cognitive goals. Pt acknowledges her progress, but states she still feels like her attention and memory deficits are impacting her daily life and would like to continue speech therapy.  -AL          User Key  (r) = Recorded By, (t) = Taken By, (c) = Cosigned By    Initials Name Effective Dates    Elsa Hays MS CCC-SLP 06/16/21 -               SLP Outcome Measures (last 72 hours)     SLP Outcome Measures     Row Name 04/12/22 1500             SLP Outcome Measures    Outcome Measure Used? Adult NOMS  -AL              Adult FCM Scores    FCM Chosen Verbal Expression;Memory  -AL      Verbal Expression FCM Score 6  -AL      Memory FCM Score 5  -AL            User Key  (r) = Recorded By, (t) = Taken By, (c) = Cosigned By    Initials Name Effective Dates    Elsa Hays MS CCC-SLP 06/16/21 -                    Time Calculation:   SLP Start Time: 1305  SLP Stop Time: 1345  SLP Time Calculation (min): 40 min  Timed Charges  36728-FP Dev of Cogn Skills Initial Minutes: 15  45151-KO Dev of Cogn Skills Add Minutes: 25  Total Minutes  Timed Charges Total Minutes: 40   Total Minutes: 40    Therapy Charges for Today     Code Description Service Date Service Provider Modifiers Qty    01580246291 HC ST DEV OF COGN SKILLS INITIAL 15 MIN 4/12/2022 Elsa Allen MS CCC-SLP  1    [id number] HC ST DEV OF COGN SKILLS EACH ADDT'L 15 MIN 4/12/2022 Elsa Allen MS CCC-SLP  2                   Elsa Allen MS CCC-SLP  4/12/2022

## 2022-04-19 ENCOUNTER — HOSPITAL ENCOUNTER (OUTPATIENT)
Dept: SPEECH THERAPY | Facility: HOSPITAL | Age: 47
Setting detail: THERAPIES SERIES
End: 2022-04-19

## 2022-04-19 ENCOUNTER — TELEPHONE (OUTPATIENT)
Dept: PHYSICAL THERAPY | Facility: HOSPITAL | Age: 47
End: 2022-04-19

## 2022-04-19 ENCOUNTER — APPOINTMENT (OUTPATIENT)
Dept: OCCUPATIONAL THERAPY | Facility: HOSPITAL | Age: 47
End: 2022-04-19

## 2022-04-19 DIAGNOSIS — M53.9 CERVICAL DYSFUNCTION: Primary | ICD-10-CM

## 2022-04-19 DIAGNOSIS — S06.9X0A TRAUMATIC BRAIN INJURY WITHOUT LOSS OF CONSCIOUSNESS, INITIAL ENCOUNTER: Primary | ICD-10-CM

## 2022-04-19 DIAGNOSIS — R41.841 COGNITIVE COMMUNICATION DEFICIT: ICD-10-CM

## 2022-04-19 DIAGNOSIS — M54.2 PAIN OF NECK WITH RECENT TRAUMATIC INJURY: ICD-10-CM

## 2022-04-26 ENCOUNTER — APPOINTMENT (OUTPATIENT)
Dept: PHYSICAL THERAPY | Facility: HOSPITAL | Age: 47
End: 2022-04-26

## 2022-04-26 ENCOUNTER — APPOINTMENT (OUTPATIENT)
Dept: SPEECH THERAPY | Facility: HOSPITAL | Age: 47
End: 2022-04-26

## 2022-04-26 ENCOUNTER — APPOINTMENT (OUTPATIENT)
Dept: OCCUPATIONAL THERAPY | Facility: HOSPITAL | Age: 47
End: 2022-04-26

## 2022-05-03 ENCOUNTER — DOCUMENTATION (OUTPATIENT)
Dept: SPEECH THERAPY | Facility: HOSPITAL | Age: 47
End: 2022-05-03

## 2022-05-03 ENCOUNTER — APPOINTMENT (OUTPATIENT)
Dept: SPEECH THERAPY | Facility: HOSPITAL | Age: 47
End: 2022-05-03

## 2022-05-03 ENCOUNTER — APPOINTMENT (OUTPATIENT)
Dept: OCCUPATIONAL THERAPY | Facility: HOSPITAL | Age: 47
End: 2022-05-03

## 2022-05-03 ENCOUNTER — APPOINTMENT (OUTPATIENT)
Dept: PHYSICAL THERAPY | Facility: HOSPITAL | Age: 47
End: 2022-05-03

## 2022-05-03 DIAGNOSIS — R41.841 COGNITIVE COMMUNICATION DEFICIT: ICD-10-CM

## 2022-05-03 DIAGNOSIS — S06.9X0A TRAUMATIC BRAIN INJURY WITHOUT LOSS OF CONSCIOUSNESS, INITIAL ENCOUNTER: Primary | ICD-10-CM

## 2022-05-03 NOTE — THERAPY DISCHARGE NOTE
Outpatient Speech Language Pathology   Adult Speech Language Cognitive/Discharge Summary       Patient Name: Lisandra Muniz  : 1975  MRN: [id number]  Today's Date: 5/3/2022         Visit Date: 2022   There is no problem list on file for this patient.         Visit Dx:    ICD-10-CM ICD-9-CM   1. 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 Cognitive communication deficit  R41.841 799.52        OP SLP Assessment/Plan - 22 1304        SLP Assessment    Functional Problems Speech Language- Adult/Cognition  -AL    Impact on Function: Adult Speech Language/Cognition Trouble learning or remembering new information  -AL    Clinical Impression: Speech Language-Adult/Congnition Mild:;Cognitive Communication Impairment  -AL          User Key  (r) = Recorded By, (t) = Taken By, (c) = Cosigned By    Initials Name Provider Type    Elsa Hays MS CCC-SLP Speech and Language Pathologist                                       SLP OP Goals     Row Name 22 1300          Written Language Expression Goals    Patient will be able to use written expressive language skills to communicate effectively in social/avocational/work setting 100%:;without cues  -AL     Status: Patient will be able to use written expressive language skills to communicate effectively in social/avocational/work setting Achieved  -AL     Patient will improve written language skills by writing dictated short words 100%:;without cues  -AL     Status: Patient will improve written language skills by writing dictated short words Achieved  -AL     Comments: Patient will improve written language skills by writing dictated short words Pt with ioccasional spelling errors, but able to self-correct.  -AL            Verbal Expression Goals    Patient will be able to use verbal expressive language skills to communicate effectively in social/avocational/work setting 100%:;without cues  -AL      Status: Patient will be able to use verbal expressive language skills to communicate effectively in social/avocational/work setting Progressing as expected  -AL     Comments: Patient will be able to use verbal expressive language skills to communicate effectively in social/avocational/work setting Pt exhibited occasional delays in word finding in complex conversation. She reported difficulty participating in conversation when multiple people are talking.  -AL     Patient will improve verbal expressive language skills by completing divergent naming tasks 100%:;without cues  -AL     Status: Patient will improve verbal expressive language skills by completing divergent naming tasks Progressing as expected  -AL     Comments: Patient will improve verbal expressive language skills by completing divergent naming tasks Inconsistently required MIN cues.  -AL            Memory Goals    Status: Patient will be able to remember information needed to participate in avocational activities Progressing as expected  -AL     Comments: Patient will be able to remember information needed to participate in avocational activities Pt made steady progress, but continued to exhibit moderate difficulty with digit span tasks and mild difficulty with immediate memory for paragraphs and high-level working memory tasks.  -AL     Patient will demonstrate improved ability to recall information by immediately recalling a series of words 90%:;without cues  -AL     Status: Patient will demonstrate improved ability to recall information by immediately recalling a series of words Progressing as expected  -AL     Patient will demonstrate improved ability to recall information by listening to paragraph and answering yes/no questions 90%:;without cues  -AL     Status: Patient will demonstrate improved ability to recall information by listening to paragraph and answering yes/no questions Progressing as expected  -AL            Attention/Orientation Goals     Patient will be able to execute all activities necessary to manage a home Independently  -AL     Status: Patient will be able to execute all activities necessary to manage a home Achieved  -AL     Patient will improve attention skills by sustaining focus to high-level cognitive tasks in order to complete task 100%:;without cues  -AL     Status: Patient will improve attention skills by sustaining focus to high-level cognitive tasks in order to complete task Achieved  -AL            Other Goals    Other Adult Goal- 1 Pt will complete high-level verbal working memory tasks with 80% accuracy with NO cues.  -AL     Status: Other Adult Goal- 1 Progressing as expected  -AL           User Key  (r) = Recorded By, (t) = Taken By, (c) = Cosigned By    Initials Name Provider Type    Elsa Hays MS CCC-SLP Speech and Language Pathologist                 OP SLP Education     Row Name 05/03/22 1305       Education    Education Comments Spoke on telephone with pt on 5/2/22. Pt had 3 consecutive appointments cancelled. She reported she would like to take a break from outpatient speech therapy due to having too many other appointments at this time (physical therapy, chiropractor). Pt may benefit from outpatient speech therapy in the future to address cognitive deficits.  -AL          User Key  (r) = Recorded By, (t) = Taken By, (c) = Cosigned By    Initials Name Effective Dates    Elsa Hays MS CCC-SLP 06/16/21 -                 SLP Outcome Measures (last 72 hours)     SLP Outcome Measures     Row Name 05/03/22 1300             SLP Outcome Measures    Outcome Measure Used? Adult NOMS  -AL              Adult FCM Scores    Verbal Expression FCM Score 6  -AL      Memory FCM Score 5  -AL            User Key  (r) = Recorded By, (t) = Taken By, (c) = Cosigned By    Initials Name Effective Dates    Elsa Hays MS CCC-SLP 06/16/21 -                      Time Calculation:                   OP SLP Discharge  Summary  Date of Discharge: 05/02/22  Reason for Discharge: identified goals partially met, other (see comments) (Pt request discontinuation of services. Due to pain medicine, inconsistently able to drive to appointments. Would like to take a break from speech therapy at this time.)  Progress Toward Achieving Short/long Term Goals: goals partially met within established timelines  Discharge Destination: home      Elsa Allen MS CCC-SLP  5/3/2022

## 2022-05-10 ENCOUNTER — APPOINTMENT (OUTPATIENT)
Dept: OCCUPATIONAL THERAPY | Facility: HOSPITAL | Age: 47
End: 2022-05-10

## 2022-05-10 ENCOUNTER — APPOINTMENT (OUTPATIENT)
Dept: SPEECH THERAPY | Facility: HOSPITAL | Age: 47
End: 2022-05-10

## 2023-01-03 NOTE — TELEPHONE ENCOUNTER
LVM re: no show, left details of next scheduled appointment and request pt. Call if unable to attend.    2 seconds or less